# Patient Record
Sex: FEMALE
[De-identification: names, ages, dates, MRNs, and addresses within clinical notes are randomized per-mention and may not be internally consistent; named-entity substitution may affect disease eponyms.]

---

## 2021-06-12 ENCOUNTER — HOSPITAL ENCOUNTER (INPATIENT)
Dept: HOSPITAL 5 - ED | Age: 56
LOS: 4 days | Discharge: HOME HEALTH | DRG: 189 | End: 2021-06-16
Attending: INTERNAL MEDICINE | Admitting: INTERNAL MEDICINE
Payer: MEDICAID

## 2021-06-12 DIAGNOSIS — T38.0X5A: ICD-10-CM

## 2021-06-12 DIAGNOSIS — F17.210: ICD-10-CM

## 2021-06-12 DIAGNOSIS — J20.9: ICD-10-CM

## 2021-06-12 DIAGNOSIS — Z90.49: ICD-10-CM

## 2021-06-12 DIAGNOSIS — J43.2: ICD-10-CM

## 2021-06-12 DIAGNOSIS — F32.9: ICD-10-CM

## 2021-06-12 DIAGNOSIS — Z71.51: ICD-10-CM

## 2021-06-12 DIAGNOSIS — Z79.899: ICD-10-CM

## 2021-06-12 DIAGNOSIS — D75.1: ICD-10-CM

## 2021-06-12 DIAGNOSIS — Z20.822: ICD-10-CM

## 2021-06-12 DIAGNOSIS — E87.5: ICD-10-CM

## 2021-06-12 DIAGNOSIS — J96.01: Primary | ICD-10-CM

## 2021-06-12 DIAGNOSIS — Z90.710: ICD-10-CM

## 2021-06-12 DIAGNOSIS — J45.909: ICD-10-CM

## 2021-06-12 DIAGNOSIS — Z79.01: ICD-10-CM

## 2021-06-12 DIAGNOSIS — C34.91: ICD-10-CM

## 2021-06-12 DIAGNOSIS — Z88.8: ICD-10-CM

## 2021-06-12 DIAGNOSIS — D72.829: ICD-10-CM

## 2021-06-12 DIAGNOSIS — Z79.891: ICD-10-CM

## 2021-06-12 DIAGNOSIS — Z98.1: ICD-10-CM
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
## 2021-06-12 DIAGNOSIS — R91.8: ICD-10-CM

## 2021-06-12 DIAGNOSIS — G62.9: ICD-10-CM

## 2021-06-12 DIAGNOSIS — Z91.19: ICD-10-CM

## 2021-06-12 LAB
ALBUMIN SERPL-MCNC: 3.9 G/DL (ref 3.9–5)
ALT SERPL-CCNC: 10 UNITS/L (ref 7–56)
APTT BLD: 28.1 SEC. (ref 24.2–36.6)
BASOPHILS # (AUTO): 0.1 K/MM3 (ref 0–0.1)
BASOPHILS NFR BLD AUTO: 0.8 % (ref 0–1.8)
BUN SERPL-MCNC: 14 MG/DL (ref 7–17)
BUN/CREAT SERPL: 16 %
CALCIUM SERPL-MCNC: 9.1 MG/DL (ref 8.4–10.2)
EOSINOPHIL # BLD AUTO: 0.2 K/MM3 (ref 0–0.4)
EOSINOPHIL NFR BLD AUTO: 1.8 % (ref 0–4.3)
HCT VFR BLD CALC: 46.9 % (ref 30.3–42.9)
HEMOLYSIS INDEX: 29
HGB BLD-MCNC: 16 GM/DL (ref 10.1–14.3)
INR PPP: 0.93 (ref 0.87–1.13)
LYMPHOCYTES # BLD AUTO: 1.8 K/MM3 (ref 1.2–5.4)
LYMPHOCYTES NFR BLD AUTO: 18.6 % (ref 13.4–35)
MCHC RBC AUTO-ENTMCNC: 34 % (ref 30–34)
MCV RBC AUTO: 94 FL (ref 79–97)
MONOCYTES # (AUTO): 1 K/MM3 (ref 0–0.8)
MONOCYTES % (AUTO): 10.8 % (ref 0–7.3)
PLATELET # BLD: 175 K/MM3 (ref 140–440)
RBC # BLD AUTO: 5.02 M/MM3 (ref 3.65–5.03)

## 2021-06-12 PROCEDURE — 94644 CONT INHLJ TX 1ST HOUR: CPT

## 2021-06-12 PROCEDURE — 85379 FIBRIN DEGRADATION QUANT: CPT

## 2021-06-12 PROCEDURE — 85007 BL SMEAR W/DIFF WBC COUNT: CPT

## 2021-06-12 PROCEDURE — 80053 COMPREHEN METABOLIC PANEL: CPT

## 2021-06-12 PROCEDURE — 86140 C-REACTIVE PROTEIN: CPT

## 2021-06-12 PROCEDURE — 84145 PROCALCITONIN (PCT): CPT

## 2021-06-12 PROCEDURE — 94640 AIRWAY INHALATION TREATMENT: CPT

## 2021-06-12 PROCEDURE — 84484 ASSAY OF TROPONIN QUANT: CPT

## 2021-06-12 PROCEDURE — 71275 CT ANGIOGRAPHY CHEST: CPT

## 2021-06-12 PROCEDURE — 96375 TX/PRO/DX INJ NEW DRUG ADDON: CPT

## 2021-06-12 PROCEDURE — 36415 COLL VENOUS BLD VENIPUNCTURE: CPT

## 2021-06-12 PROCEDURE — 93970 EXTREMITY STUDY: CPT

## 2021-06-12 PROCEDURE — 83036 HEMOGLOBIN GLYCOSYLATED A1C: CPT

## 2021-06-12 PROCEDURE — 83615 LACTATE (LD) (LDH) ENZYME: CPT

## 2021-06-12 PROCEDURE — G0378 HOSPITAL OBSERVATION PER HR: HCPCS

## 2021-06-12 PROCEDURE — 82728 ASSAY OF FERRITIN: CPT

## 2021-06-12 PROCEDURE — 93005 ELECTROCARDIOGRAM TRACING: CPT

## 2021-06-12 PROCEDURE — 83735 ASSAY OF MAGNESIUM: CPT

## 2021-06-12 PROCEDURE — 71045 X-RAY EXAM CHEST 1 VIEW: CPT

## 2021-06-12 PROCEDURE — 85730 THROMBOPLASTIN TIME PARTIAL: CPT

## 2021-06-12 PROCEDURE — 85610 PROTHROMBIN TIME: CPT

## 2021-06-12 PROCEDURE — 96365 THER/PROPH/DIAG IV INF INIT: CPT

## 2021-06-12 PROCEDURE — U0003 INFECTIOUS AGENT DETECTION BY NUCLEIC ACID (DNA OR RNA); SEVERE ACUTE RESPIRATORY SYNDROME CORONAVIRUS 2 (SARS-COV-2) (CORONAVIRUS DISEASE [COVID-19]), AMPLIFIED PROBE TECHNIQUE, MAKING USE OF HIGH THROUGHPUT TECHNOLOGIES AS DESCRIBED BY CMS-2020-01-R: HCPCS

## 2021-06-12 PROCEDURE — 82962 GLUCOSE BLOOD TEST: CPT

## 2021-06-12 PROCEDURE — 85025 COMPLETE CBC W/AUTO DIFF WBC: CPT

## 2021-06-12 PROCEDURE — 82947 ASSAY GLUCOSE BLOOD QUANT: CPT

## 2021-06-12 PROCEDURE — 80048 BASIC METABOLIC PNL TOTAL CA: CPT

## 2021-06-12 NOTE — EVENT NOTE
ED Screening Note


Date of service: 06/12/21


Time: 17:04


ED Screening Note: 


56-year-old female with a past medical history of COPD presents to the ER today 

with complaints of increased shortness of breath for the past 3 to 4 days.  She 

reports associated cough, wheezing, low-grade fever of 99.5, rhinorrhea, nasal 

congestion, sore throat, wheezing and chest tightness.  She also complains of a 

rash to her lower abdomen and buttocks area which she noticed a few days ago and

has been pruritic in nature.





She admits that she used to be on oxygen but has been on oxygen in the past 5 to

6 years due to her noncompliance with follow-up and a history of drug abuse she 

has been in [facility].  She has also been out of her inhalers and nebulizer 

treatments for of about 2 years


She is currently in rehab for her drug abuse.  Drug of choice was crack.  She is

recommending that we do not give her any narcotics or addictive medications 

during her visit.








This initial assessment/diagnostic orders/clinical plan/treatment(s) is/are 

subject to change based on patients health status, clinical progression and re-

assessment by fellow clinical providers in the ED. Further treatment and workup 

at subsequent clinical providers discretion. Patient/guardian urged not to elope

from the ED as their condition may be serious if not clinically assessed and 

managed. 





Initial orders include: 


Dyspnea order set including nebulizer treatment and Solu-Medrol

## 2021-06-12 NOTE — EMERGENCY DEPARTMENT REPORT
ED Shortness of Breath HPI





- General


Chief Complaint: Dyspnea/Respdistress


Stated Complaint: TROUBLE BREATHING/COUGH/FEVER/RASH


Time Seen by Provider: 06/12/21 21:52


Source: patient


Mode of arrival: Ambulatory


Limitations: No Limitations





- History of Present Illness


Initial Comments: 





Patient is a 56-year-old female that presents emergency room with complaints of 

difficulty breathing, shortness of breath, cough, fever.  Patient states that 

she has been going on for 1 week.  Patient states her symptoms are worsening.  

Patient states she has history of asthma and COPD and she not take anything for 

it.  Patient states that she also has a rash on her abdomen has been going on 

for 1 week.  Patient states the rash itches.  Patient denies pain in her ab

domen.  Patient denies chest pain.


MD Complaint: shortness of breath, cough


-: Sudden, days(s)


Severity: severe


Improves With: rest


Worsens With: exertion


Known History Of: COPD, asthma


Context: medication noncompliance


Associated Symptoms: fever, cough


Treatments Prior to Arrival: none





- Related Data


Home Oxygen Therapy: No


                                Home Medications











 Medication  Instructions  Recorded  Confirmed  Last Taken


 


Baclofen 20 mg PO Q8H PRN 04/17/14 04/17/14 Unknown


 


Citalopram [Celexa] 20 mg PO DAILY 04/17/14 04/17/14 Unknown


 


Pregabalin [Lyrica] 150 mg PO BID 04/17/14 04/17/14 Unknown


 


traMADoL [Ultram 50 MG tab] 50 mg PO Q8H PRN 04/17/14 04/17/14 Unknown








                                  Previous Rx's











 Medication  Instructions  Recorded  Last Taken  Type


 


Naproxen [Naprosyn] 500 mg PO BID PRN 5 Days #10 tablet 07/28/20 Unknown Rx


 


methOCARBAMOL [Robaxin TAB] 1,500 mg PO Q8H PRN #22 tablet 07/28/20 Unknown Rx











                                    Allergies











Allergy/AdvReac Type Severity Reaction Status Date / Time


 


steroids AdvReac  Unknown Uncoded 04/17/14 14:28














ED Review of Systems


ROS: 


Stated complaint: TROUBLE BREATHING/COUGH/FEVER/RASH


Other details as noted in HPI





Constitutional: fever.  denies: chills


Eyes: denies: eye pain, eye discharge, vision change


ENT: denies: ear pain, throat pain


Respiratory: cough, shortness of breath, SOB with exertion, SOB at rest.  

denies: wheezing


Cardiovascular: denies: chest pain, palpitations


Endocrine: no symptoms reported


Gastrointestinal: denies: abdominal pain, nausea, diarrhea


Genitourinary: denies: urgency, dysuria, discharge


Musculoskeletal: denies: back pain, joint swelling, arthralgia


Skin: as per HPI, rash.  denies: lesions


Neurological: denies: headache, weakness, paresthesias


Psychiatric: denies: anxiety, depression


Hematological/Lymphatic: denies: easy bleeding, easy bruising





ED Past Medical Hx





- Past Medical History


Previous Medical History?: Yes


Hx Psychiatric Treatment: Yes (Depresssion)


Hx Asthma: Yes


Hx COPD: Yes


Additional medical history: Chrons.  ulcerative colitis.  Hep B.  neuropathy





- Surgical History


Past Surgical History?: Yes


Hx Appendectomy: Yes


Additional Surgical History: hysterectomy.  tonsillectomy.  Cervical fusion





- Social History


Smoking Status: Current Every Day Smoker


Substance Use Type: None





- Medications


Home Medications: 


                                Home Medications











 Medication  Instructions  Recorded  Confirmed  Last Taken  Type


 


Baclofen 20 mg PO Q8H PRN 04/17/14 04/17/14 Unknown History


 


Citalopram [Celexa] 20 mg PO DAILY 04/17/14 04/17/14 Unknown History


 


Pregabalin [Lyrica] 150 mg PO BID 04/17/14 04/17/14 Unknown History


 


traMADoL [Ultram 50 MG tab] 50 mg PO Q8H PRN 04/17/14 04/17/14 Unknown History


 


Naproxen [Naprosyn] 500 mg PO BID PRN 5 Days #10 tablet 07/28/20  Unknown Rx


 


methOCARBAMOL [Robaxin TAB] 1,500 mg PO Q8H PRN #22 tablet 07/28/20  Unknown Rx














ED Physical Exam





- General


Limitations: No Limitations


General appearance: alert, in distress





- Head


Head exam: Present: atraumatic, normocephalic





- Eye


Eye exam: Present: normal appearance, PERRL


Pupils: Present: normal accommodation





- ENT


ENT exam: Present: mucous membranes dry





- Neck


Neck exam: Present: normal inspection





- Respiratory


Respiratory exam: Present: respiratory distress, wheezes, accessory muscle use





- Cardiovascular


Cardiovascular Exam: Present: regular rate, normal rhythm.  Absent: systolic 

murmur, diastolic murmur, rubs, gallop





- GI/Abdominal


GI/Abdominal exam: Present: soft, normal bowel sounds





- Extremities Exam


Extremities exam: Present: normal inspection





- Back Exam


Back exam: Present: normal inspection





- Neurological Exam


Neurological exam: Present: alert, oriented X3





- Psychiatric


Psychiatric exam: Present: normal affect, normal mood





- Skin


Skin exam: Present: warm, dry, intact, normal color.  Absent: rash





ED Course


                                   Vital Signs











  06/12/21 06/12/21 06/12/21





  14:56 22:13 22:37


 


Temperature 99.4 F  


 


Pulse Rate 86  


 


Pulse Rate [  80 





Bilateral   





Throughout]   


 


Respiratory 30 H  18





Rate   


 


Respiratory  20 





Rate [Bilateral   





Throughout]   


 


Blood Pressure   


 


Blood Pressure 111/70  





[Right]   


 


O2 Sat by Pulse 95  





Oximetry   














  06/12/21 06/12/21 06/12/21





  22:50 23:00 23:16


 


Temperature   


 


Pulse Rate   


 


Pulse Rate [   





Bilateral   





Throughout]   


 


Respiratory   





Rate   


 


Respiratory   





Rate [Bilateral   





Throughout]   


 


Blood Pressure 101/61 109/63 109/63


 


Blood Pressure   





[Right]   


 


O2 Sat by Pulse  88 83 L





Oximetry   














  06/12/21





  23:30


 


Temperature 


 


Pulse Rate 


 


Pulse Rate [ 





Bilateral 





Throughout] 


 


Respiratory 





Rate 


 


Respiratory 





Rate [Bilateral 





Throughout] 


 


Blood Pressure 109/63


 


Blood Pressure 





[Right] 


 


O2 Sat by Pulse 85





Oximetry 














- Reevaluation(s)


Reevaluation #1: 


Patient states she is feeling a little bit better.  Patient's work to breathe ha

s decreased slightly.  Patient's oxygen is 83 percent on room air.  Patient 

placed on oxygen.  After oxygen placed patient's oxygen improved to 96%.  

Patient is on 5 L.


06/12/21 22:11











Reevaluation #2: 


I discussed all results with patient.  I discussed plan of care with patient.  

Patient agrees with plan of care and admission.  Patient to be admitted to the 

hospitalist service.


06/13/21 00:08








ED Medical Decision Making





- Lab Data


Result diagrams: 


                                 06/12/21 17:04





                                 06/12/21 17:04





- EKG Data


-: EKG Interpreted by Me


EKG shows normal: sinus rhythm, axis, intervals, QRS complexes, ST-T waves


Rate: normal





- Radiology Data


Radiology results: report reviewed, image reviewed


interpreted by me: 





Chest x-ray: No pneumonia, no pneumothorax, no foreign body, no osseous 

findings, no acute findings














CTA CHEST WITH IV CONTRAST  


 


 INDICATION: Shortness of breath  


 


 TECHNIQUE:  


 Axial CT images were obtained through the chest after injection of IV contrast.

  Coronal oblique 2-


D reconstruction images were produced.  3 plane MIP reconstruction images were 

produced at an 


independent workstation.  All CTs at this facility utilize dose reduction 

techniques including 


automated exposure control, iterative reconstruction and weight based dosing 

when appropriate to 


reduce patient radiation dose to as low as reasonable achievable.    


 


 COMPARISON: Chest radiograph, 6/12/2021. No prior cross-sectional imaging is 

available for 


comparison  


 


 FINDINGS:  


 No filling defects are visualized within the central or segmental pulmonary 

arteries to suggest 


pulmonary embolism. The heart is normal in size. The thoracic aorta is normal in

 caliber.  


 


 Evaluation of the lung parenchyma demonstrates a spiculated mass in the right 

upper lobe measuring 


1.6 x 1.4 cm. A similar smaller spiculated density is seen within the left upper

 lobe measuring 0.8 


x 0.9 cm. There is a background of diffuse emphysematous change.  


 


 Limited imaging of the upper abdomen demonstrates No evidence of acute 

abnormality.  


 


 Bones and soft tissues: Evaluation of bony structures demonstrates a 9 mm 

sclerotic lesion within 


the left scapula. There are moderate multilevel degenerative changes throughout 

the thoracic spine.  


 


 IMPRESSION:  


 1.  Spiculated mass within the right upper lobe and smaller spiculated density 

within the left 


upper lobe. Findings are concerning for bronchogenic neoplasm.  


 2. Sclerotic lesion within the left scapula is nonspecific but could represent 

metastasis given the


patient's suspicious lung mass.  


 3. No evidence of pulmonary embolism.  


 4. Emphysema.  


=========================================================================


 


CHEST 1 VIEW 6/12/2021 5:22 PM  


 


 INDICATION / CLINICAL INFORMATION: Dyspnea.  


 


 COMPARISON: 3/21/2020  


 


 FINDINGS:  


 


 SUPPORT DEVICES: None.  


 HEART / MEDIASTINUM: Stable.   


 LUNGS / PLEURA: No significant pulmonary or pleural abnormality. No 

pneumothorax.   


 


 ADDITIONAL FINDINGS: No significant additional findings.  


 


 IMPRESSION:  


 1. No acute findings.


=========================================================================





- Medical Decision Making





Patient is a 56-year-old female who presents emergency room with complaints of 

shortness of breath, cough and fever.  Patient's found to have increased work of

 breathing respiratory distress plan initial evaluation.  Patient given 

medications to include Solu-Medrol, Decadron and mag.  Patient continued to have

 low oxygen even after treatment.  Patient was then placed on 5 L of oxygen and 

her O2 sat improved.  Patient had a chest x-ray which was negative for acute 

finding.  I personally reviewed the chest x-ray patient had a EKG which shows 

normal ST and no acute findings.  I personally reviewed EKG.  Patient had a CTA 

to rule out PE and it shows lung masses and no other acute findings.  Patient 

had labs done which were essentially unremarkable.  Patient admitted to the [facility] service for further evaluation treatment.





Critical care time documented due to the multiple reassessments, prolonged time 

at the bedside, interpretation of diagnostics and labs.











- Differential Diagnosis


COPD exacerbation, hypoxia, shortness of breath, Covid, pneumonia, PE


Critical Care Time: Yes


Critical care time in (mins) excluding proc time.: 35


Critical care attestation.: 


If time is entered above; I have spent that time in minutes in the direct care 

of this critically ill patient, excluding procedure time.





Critical Care Time: 





35 minutes











ED Disposition


Clinical Impression: 


 SOB (shortness of breath), COPD exacerbation, Lung mass





Respiratory failure


Qualifiers:


 Chronicity: acute Respiratory failure complication: hypoxia Qualified Code(s): 

J96.01 - Acute respiratory failure with hypoxia





Fever


Qualifiers:


 Fever type: unspecified Qualified Code(s): R50.9 - Fever, unspecified





Disposition: DC-09 OP ADMIT IP TO THIS HOSP


Is pt being admited?: Yes


Does the pt Need Aspirin: No


Condition: Critical


Instructions:  Chronic Obstructive Pulmonary Disease (ED)


Time of Disposition: 00:09

## 2021-06-12 NOTE — XRAY REPORT
CHEST 1 VIEW 6/12/2021 5:22 PM



INDICATION / CLINICAL INFORMATION: Dyspnea.



COMPARISON: 3/21/2020



FINDINGS:



SUPPORT DEVICES: None.

HEART / MEDIASTINUM: Stable. 

LUNGS / PLEURA: No significant pulmonary or pleural abnormality. No pneumothorax. 



ADDITIONAL FINDINGS: No significant additional findings.



IMPRESSION:

1. No acute findings.



Signer Name: Oziel Hernandez MD 

Signed: 6/12/2021 5:32 PM

Workstation Name: [id number]

## 2021-06-12 NOTE — CAT SCAN REPORT
CTA CHEST WITH IV CONTRAST



INDICATION: Shortness of breath



TECHNIQUE:

Axial CT images were obtained through the chest after injection of IV contrast.  Coronal oblique 2-D 
reconstruction images were produced.  3 plane MIP reconstruction images were produced at an [employer] workstation.  All CTs at this facility utilize dose reduction techniques including automated expos
ure control, iterative reconstruction and weight based dosing when appropriate to reduce patient radi
ation dose to as low as reasonable achievable.  



COMPARISON: Chest radiograph, 6/12/2021. No prior cross-sectional imaging is available for comparison




FINDINGS:

No filling defects are visualized within the central or segmental pulmonary arteries to suggest pulmo
nary embolism. The heart is normal in size. The thoracic aorta is normal in caliber.



Evaluation of the lung parenchyma demonstrates a spiculated mass in the right upper lobe measuring 1.
6 x 1.4 cm. A similar smaller spiculated density is seen within the left upper lobe measuring 0.8 x 0
.9 cm. There is a background of diffuse emphysematous change.



Limited imaging of the upper abdomen demonstrates No evidence of acute abnormality.



Bones and soft tissues: Evaluation of bony structures demonstrates a 9 mm sclerotic lesion within the
 left scapula. There are moderate multilevel degenerative changes throughout the thoracic spine.



IMPRESSION:

1.  Spiculated mass within the right upper lobe and smaller spiculated density within the left upper 
lobe. Findings are concerning for bronchogenic neoplasm.

2. Sclerotic lesion within the left scapula is nonspecific but could represent metastasis given the p
atient's suspicious lung mass.

3. No evidence of pulmonary embolism.

4. Emphysema.



Signer Name: Destiny Lozano MD 

Signed: 6/12/2021 11:19 PM

Workstation Name: [id number]

## 2021-06-13 LAB — CRP SERPL-MCNC: 2.4 MG/DL (ref 0–1.3)

## 2021-06-13 RX ADMIN — METHYLPREDNISOLONE SODIUM SUCCINATE SCH MG: 125 INJECTION, POWDER, FOR SOLUTION INTRAMUSCULAR; INTRAVENOUS at 21:49

## 2021-06-13 RX ADMIN — ENOXAPARIN SODIUM SCH MG: 100 INJECTION SUBCUTANEOUS at 10:20

## 2021-06-13 RX ADMIN — Medication SCH ML: at 10:20

## 2021-06-13 RX ADMIN — IPRATROPIUM BROMIDE AND ALBUTEROL SULFATE SCH AMPUL: .5; 3 SOLUTION RESPIRATORY (INHALATION) at 20:36

## 2021-06-13 RX ADMIN — Medication SCH ML: at 21:49

## 2021-06-13 RX ADMIN — FAMOTIDINE SCH MG: 10 INJECTION, SOLUTION INTRAVENOUS at 10:20

## 2021-06-13 RX ADMIN — FAMOTIDINE SCH MG: 10 INJECTION, SOLUTION INTRAVENOUS at 21:49

## 2021-06-13 NOTE — HISTORY AND PHYSICAL REPORT
History of Present Illness


Date of examination: 06/13/21


Date of admission: 


06/13/21 00:11





Chief complaint: 





shortness of breath


History of present illness: 





Patient is a 56-year-old female that presents emergency room with complaints of 

difficulty breathing, shortness of breath, cough, fever.  Patient states that 

she has been going on for 1 week.  Patient states her symptoms are worsening.  

Patient states she has history of asthma and COPD and she not take anything for 

it.  Patient states that she also has a rash on her abdomen has been going on 

for 1 week.  Patient states the rash itches.  Patient denies pain in her 

abdomen.  Patient denies chest pain.





ED work-up shows WBC 9.5 hemoglobin 16.0 platelets is 175, sodium is 138, 

potassium is 5.5, D-dimer is 45.04, creatinine is 0.9.  Chest x-ray done no 

acute finding.  Due to elevated D-dimer CT of the chest donenegative for 

pulmonary embolism per report showed emphysema and lung mass within the right up

per lobe and small spiculated density within the left upper lobe finding 

concerning for bronchogenic neoplasm.  Patient seen at bedside in the ED.  She 

is alert oriented x3.  She is on oxygen per nasal cannula.  She reported mild 

shortness of breath.  She admits tobacco use 1 pack daily.  Discussed tobacco 

use cessation.  Patient voiced understanding.  Reviewed lab medication record 

and vital signs.  Will consult pulmonologist.





Past History


Past Medical History: COPD


Past Surgical History: appendectomy, hysterectomy, tonsillectomy


Social history: smoking


Family history: no significant family history





Medications and Allergies


                                    Allergies











Allergy/AdvReac Type Severity Reaction Status Date / Time


 


steroids AdvReac  Unknown Uncoded 04/17/14 14:28











                                Home Medications











 Medication  Instructions  Recorded  Confirmed  Last Taken  Type


 


Baclofen 20 mg PO Q8H PRN 04/17/14 06/13/21 Unknown History


 


Citalopram [Celexa] 20 mg PO DAILY 04/17/14 06/13/21 Unknown History


 


Pregabalin [Lyrica] 150 mg PO BID 04/17/14 06/13/21 Unknown History


 


traMADoL [Ultram 50 MG tab] 50 mg PO Q8H PRN 04/17/14 06/13/21 Unknown History


 


Naproxen [Naprosyn] 500 mg PO BID PRN 5 Days #10 tablet 07/28/20 06/13/21 

Unknown Rx


 


methOCARBAMOL [Robaxin TAB] 1,500 mg PO Q8H PRN #22 tablet 07/28/20 06/13/21 

Unknown Rx














Review of Systems


Ears, nose, mouth and throat: no epistaxis, no bleeding gums


Respiratory: cough, shortness of breath


Gastrointestinal: no melena


Rectal: no itching, no hemorrhoids


Musculoskeletal: no hot joints


Integumentary: no rash, no pruritis


Psychiatric: anxiety, depression


Hematologic/Lymphatic: no easy bruising, no easy bleeding


Allergic/Immunologic: no urticaria





Exam





- Constitutional


Vitals: 


                                        











Temp Pulse Resp BP Pulse Ox


 


 99.4 F   80   18   109/63   85 


 


 06/12/21 14:56  06/12/21 22:13  06/12/21 22:37  06/12/21 23:30  06/12/21 23:30











General appearance: Present: mild distress, well-nourished





- EENT


Eyes: Present: PERRL


ENT: hearing intact, clear oral mucosa





- Neck


Neck: Present: supple, normal ROM





- Respiratory


Respiratory effort: normal


Respiratory: bilateral: CTA





- Cardiovascular


Heart Sounds: Present: S1 & S2.  Absent: rub, click





- Extremities


Extremities: pulses symmetrical, No edema


Peripheral Pulses: within normal limits





- Abdominal


General gastrointestinal: Present: soft, non-tender, non-distended, normal bowel

sounds


Female genitourinary: Present: normal





- Integumentary


Integumentary: Present: clear, warm, dry





- Musculoskeletal


Musculoskeletal: gait normal, strength equal bilaterally





- Psychiatric


Psychiatric: appropriate mood/affect, intact judgment & insight, cooperative





- Neurologic


Neurologic: CNII-XII intact, moves all extremities





HEART Score





- HEART Score


Troponin: 


                                        











Troponin T  < 0.010 ng/mL (0.00-0.029)   06/12/21  22:53    














Results





- Labs


CBC & Chem 7: 


                                 06/12/21 17:04





                                 06/13/21 01:55


Labs: 


                              Abnormal lab results











  06/12/21 06/12/21 Range/Units





  17:04 17:04 


 


Hgb  16.0 H   (10.1-14.3)  gm/dl


 


Hct  46.9 H   (30.3-42.9)  %


 


Mono % (Auto)  10.8 H   (0.0-7.3)  %


 


Mono # (Auto)  1.0 H   (0.0-0.8)  K/mm3


 


Potassium   5.5 H  (3.6-5.0)  mmol/L














Assessment and Plan





- Patient Problems


(1) COPD exacerbation


Current Visit: Yes   Status: Acute   


Plan to address problem: 


Continue respiratory care


Bronchodilator and oxygen supplement


Pulmonologist consulted








(2) Lung mass


Current Visit: Yes   Status: Acute   


Plan to address problem: 


Lung mass appreciated on CT of the chest


Pulmonologist consults


Monitor ABG and oxygen supplement








(3) Respiratory failure with hypoxia


Current Visit: Yes   Status: Acute   


Plan to address problem: 


Hypoxia likely secondary to emphysema lung mass


Continue oxygen supplements








(4) Tobacco abuse


Current Visit: Yes   Status: Acute   


Plan to address problem: 


Patient admits tobacco use 1 pack daily 


discussed tobacco use cessation








(5) Elevated d-dimer


Current Visit: Yes   Status: Acute   


Plan to address problem: 


CT of the chest is negative for PE


Bilateral leg ultrasound ordered








(6) DVT prophylaxis


Current Visit: Yes   Status: Acute   


Plan to address problem: 


Subcutaneous Lovenox

## 2021-06-13 NOTE — CONSULTATION
History of Present Illness





- Reason for Consult


Consult date: 06/13/21


COVID-19 PUI


Requesting physician: CEZAR GIBSON III





- History of Present Illness





The patient is a 55-year-old female with COPD admitted with shortness of breath,

cough for 1 week.  She was noted to be hypoxic on admission.  Labs revealed no 

leukocytosis, D-dimer 1245, ferritin 50.4, CRP 2.4, procalcitonin 0.08.  COVID-

19 test is pending.  She is admitted as PUI.  DVT scan negative.  CT chest with 

spiculated mass in right upper lobe and smaller spiculated density in left upper

lobe suggestive of possible neoplasm.  No evidence of obvious pneumonia.





Review of Systems: 


reviewed in the chart, unable to obtain, minimize risk of transmission





Past History


Past Medical History: COPD


Past Surgical History: appendectomy, hysterectomy, tonsillectomy


Social history: smoking


Family history: no significant family history





Medications and Allergies


                                    Allergies











Allergy/AdvReac Type Severity Reaction Status Date / Time


 


steroids AdvReac  Unknown Uncoded 04/17/14 14:28











                                Home Medications











 Medication  Instructions  Recorded  Confirmed  Last Taken  Type


 


Baclofen 20 mg PO Q8H PRN 04/17/14 06/13/21 Unknown History


 


Citalopram [Celexa] 20 mg PO DAILY 04/17/14 06/13/21 Unknown History


 


Pregabalin [Lyrica] 150 mg PO BID 04/17/14 06/13/21 Unknown History


 


traMADoL [Ultram 50 MG tab] 50 mg PO Q8H PRN 04/17/14 06/13/21 Unknown History


 


Naproxen [Naprosyn] 500 mg PO BID PRN 5 Days #10 tablet 07/28/20 06/13/21 

Unknown Rx


 


methOCARBAMOL [Robaxin TAB] 1,500 mg PO Q8H PRN #22 tablet 07/28/20 06/13/21 

Unknown Rx











Active Meds: 


Active Medications





Acetaminophen (Acetaminophen 325 Mg Tab)  650 mg PO Q4H PRN


   PRN Reason: Pain MILD(1-3)/Fever >100.5/HA


Al Hydrox/Mg Hydrox/Simethicone (Alum-Mag Hydroxide-Simethicone [phone]/5ml

Oral Liqd 30 Ml)  30 ml PO Q4H PRN


   PRN Reason: Indigestion


Enoxaparin Sodium (Enoxaparin 40 Mg/0.4 Ml Inj)  40 mg SUB-Q DAILY [facility]; Protocol


   Last Admin: 06/13/21 10:20 Dose:  40 mg


   Documented by: 


Famotidine (Famotidine 20 Mg/2 Ml Inj)  20 mg IV BID [facility]


   Last Admin: 06/13/21 10:20 Dose:  20 mg


   Documented by: 


Magnesium Hydroxide (Magnesium Hydroxide (Mom) Oral Liqd Udc)  30 ml PO Q4H PRN


   PRN Reason: Constipation


Ondansetron HCl (Ondansetron 4 Mg/2 Ml Inj)  4 mg IV Q8H PRN


   PRN Reason: Nausea And Vomiting


Senna (Sennosides 8.6 Mg Tab)  8.6 mg PO Q12HR PRN


   PRN Reason: Constipation


Sodium Chloride (Sodium Chloride 0.9% 10 Ml Flush Syringe)  10 ml IV BID [facility]


   Last Admin: 06/13/21 10:20 Dose:  10 ml


   Documented by: 


Sodium Chloride (Sodium Chloride 0.9% 10 Ml Flush Syringe)  10 ml IV PRN PRN


   PRN Reason: LINE FLUSH











Physical Examination





- Physical Exam


Narrative exam: 





Physical Exam (reviewed in chart to minimize risk of transmission)


Constitutional: deferred


Head, Ears, Nose: deferred


Eyes: deferred


Neck: deferred


Oral: deferred


Cardiovascular: deferred


Respiratory: deferred


GI: deferred


Musculoskeletal: deferred


Skin: deferred


Hem/Lymphatic: deferred


Psych: deferred


Neurological: deferred





- Constitutional


Vitals: 


                                   Vital Signs











Temp Pulse Resp BP Pulse Ox


 


 98.5 F   68   22   106/68   96 


 


 06/13/21 12:37  06/13/21 12:37  06/13/21 12:37  06/13/21 12:37  06/13/21 12:37








                           Temperature -Last 24 Hours











Temperature                    98.5 F


 


Temperature                    98.2 F


 


Temperature                    99.4 F

















Results





- Labs


CBC & Chem 7: 


                                 06/12/21 17:04





                                 06/13/21 01:55


Labs: 


                              Abnormal lab results











  06/12/21 06/12/21 06/13/21 Range/Units





  17:04 17:04 01:55 


 


Hgb  16.0 H    (10.1-14.3)  gm/dl


 


Hct  46.9 H    (30.3-42.9)  %


 


Mono % (Auto)  10.8 H    (0.0-7.3)  %


 


Mono # (Auto)  1.0 H    (0.0-0.8)  K/mm3


 


D-Dimer    1245.04 H  (0-234)  ng/mlDDU


 


Potassium   5.5 H   (3.6-5.0)  mmol/L


 


Glucose     ()  mg/dL


 


C-Reactive Protein     (0.00-1.30)  mg/dL














  06/13/21 Range/Units





  01:55 


 


Hgb   (10.1-14.3)  gm/dl


 


Hct   (30.3-42.9)  %


 


Mono % (Auto)   (0.0-7.3)  %


 


Mono # (Auto)   (0.0-0.8)  K/mm3


 


D-Dimer   (0-234)  ng/mlDDU


 


Potassium   (3.6-5.0)  mmol/L


 


Glucose  177 H  ()  mg/dL


 


C-Reactive Protein  2.40 H  (0.00-1.30)  mg/dL














- Imaging and Cardiology


Chest x-ray: report reviewed, image reviewed (no pneumonia seen.)





Assessment and Plan


Cultures:


SARS CoV2 PCR: Pending








A/P:


55-year-old female with COPD admitted with shortness of breath, cough for 1 

week:





#COVID-19 PUI: Pending.  Inflammatory markers are not elevated. CXR without 

pneumonia. 





#Acute hypoxic respiratory failure





#COPD





#Spiculated lung lesion: Evaluation per primary team








Recs:


Follow-up COVID-19 PCR.  Suspicion is low.


Procalcitonin is low, antibiotics not needed


Work-up for lung lesion per primary/pulmonary








Guillermina Foss MD, FACP


[facility] ([facility]


O: [phone]


F: [phone]

## 2021-06-13 NOTE — EVENT NOTE
Date: 06/13/21





This is the second visit after midnight


Patient seen and examined


Vitals noted and stable


Patient does complains of shortness of breath but denies any chest pain


Coarse breath sounds auscultated bilaterally, S1-S2 positive, bowel sounds 

positive, no leg edema


56-year-old female admitted to the hospital for shortness of breath due to COPD 

exacerbation and possible right lung carcinoma


Continue empiric antibiotic, empiric steroid, scheduled nebulizer breathing 

treatment


Consulted pulmonary, patient may need lung biopsy


Continue current management and plan and follow pulmonary recommendation





--It took me about 28 minutes to reevaluate and reasses this patient, discussed 

with RN/CM, review medical documents, lab results, imaging, medication list and 

placing order.

## 2021-06-13 NOTE — VASCULAR LAB REPORT
DUPLEX DOPPLER LOWER EXTREMITY VEINS, BILATERAL



INDICATION / CLINICAL INFORMATION:

r/o dvt.



TECHNIQUE:

Duplex doppler imaging was performed through the veins of both lower extremities using venous indio
simone and other maneuvers.



COMPARISON: 

None available.



FINDINGS:

RIGHT COMMON FEMORAL VEIN: Negative.

RIGHT FEMORAL VEIN: Negative.

RIGHT POPLITEAL VEIN: Negative.

RIGHT CALF VEINS: Negative.



LEFT COMMON FEMORAL VEIN: Negative.

LEFT FEMORAL VEIN: Negative.

LEFT POPLITEAL VEIN: Negative.

LEFT CALF VEINS: Negative.



ADDITIONAL FINDINGS: None.



IMPRESSION:

1. No sonographic evidence for DVT in either lower extremity.



Signer Name: Oziel Hernandez MD 

Signed: 6/13/2021 11:03 AM

Workstation Name: [id number]

## 2021-06-13 NOTE — CONSULTATION
History of Present Illness


Consult date: 06/13/21


Requesting physician: TERRA AVILES


Reason for consult: dyspnea


History of present illness: 





57 yo admitted w/ increased SOB, wheezing, cough with white sputum. No fevers, 

chills, chest pain, hemoptysis.





Active Medications





Acetaminophen (Acetaminophen 325 Mg Tab)  650 mg PO Q4H PRN


   PRN Reason: Pain MILD(1-3)/Fever >100.5/HA


Al Hydrox/Mg Hydrox/Simethicone (Alum-Mag Hydroxide-Simethicone [phone]/5ml

Oral Liqd 30 Ml)  30 ml PO Q4H PRN


   PRN Reason: Indigestion


Albuterol/Ipratropium (Ipratropium/Albuterol Sulfate 3 Ml Ampul.Neb)  1 ampul [facility]

[facility]


Enoxaparin Sodium (Enoxaparin 40 Mg/0.4 Ml Inj)  40 mg SUB-Q DAILY [facility]; Protocol


   Last Admin: 06/13/21 10:20 Dose:  40 mg


   Documented by: 


Famotidine (Famotidine 20 Mg/2 Ml Inj)  20 mg IV BID [facility]


   Last Admin: 06/13/21 10:20 Dose:  20 mg


   Documented by: 


Magnesium Hydroxide (Magnesium Hydroxide (Mom) Oral Liqd Udc)  30 ml PO Q4H PRN


   PRN Reason: Constipation


Methylprednisolone Sodium Succinate (Methylprednisolone Sod Succinate 125 Mg/2 

Ml Inj)  60 mg IV Q8HR [facility]


Ondansetron HCl (Ondansetron 4 Mg/2 Ml Inj)  4 mg IV Q8H PRN


   PRN Reason: Nausea And Vomiting


Senna (Sennosides 8.6 Mg Tab)  8.6 mg PO Q12HR PRN


   PRN Reason: Constipation


Sodium Chloride (Sodium Chloride 0.9% 10 Ml Flush Syringe)  10 ml IV BID [facility]


   Last Admin: 06/13/21 10:20 Dose:  10 ml


   Documented by: 


Sodium Chloride (Sodium Chloride 0.9% 10 Ml Flush Syringe)  10 ml IV PRN PRN


   PRN Reason: LINE FLUSH











Past History


Past Medical History: COPD


Past Surgical History: appendectomy, hysterectomy, tonsillectomy


Social history: smoking, full code.  denies: alcohol abuse, prescription drug 

abuse, IV drug use


Family history: no significant family history (no pulm issues reported)





Medications and Allergies


                                    Allergies











Allergy/AdvReac Type Severity Reaction Status Date / Time


 


steroids AdvReac  Unknown Uncoded 04/17/14 14:28











                                Home Medications











 Medication  Instructions  Recorded  Confirmed  Last Taken  Type


 


Baclofen 20 mg PO Q8H PRN 04/17/14 06/13/21 Unknown History


 


Citalopram [Celexa] 20 mg PO DAILY 04/17/14 06/13/21 Unknown History


 


Pregabalin [Lyrica] 150 mg PO BID 04/17/14 06/13/21 Unknown History


 


traMADoL [Ultram 50 MG tab] 50 mg PO Q8H PRN 04/17/14 06/13/21 Unknown History


 


Naproxen [Naprosyn] 500 mg PO BID PRN 5 Days #10 tablet 07/28/20 06/13/21 Unkn

own Rx


 


methOCARBAMOL [Robaxin TAB] 1,500 mg PO Q8H PRN #22 tablet 07/28/20 06/13/21 

Unknown Rx











Active Meds: 


Active Medications





Acetaminophen (Acetaminophen 325 Mg Tab)  650 mg PO Q4H PRN


   PRN Reason: Pain MILD(1-3)/Fever >100.5/HA


Al Hydrox/Mg Hydrox/Simethicone (Alum-Mag Hydroxide-Simethicone [phone]/5ml

Oral Liqd 30 Ml)  30 ml PO Q4H PRN


   PRN Reason: Indigestion


Enoxaparin Sodium (Enoxaparin 40 Mg/0.4 Ml Inj)  40 mg SUB-Q DAILY [facility]; Protocol


   Last Admin: 06/13/21 10:20 Dose:  40 mg


   Documented by: 


Famotidine (Famotidine 20 Mg/2 Ml Inj)  20 mg IV BID [facility]


   Last Admin: 06/13/21 10:20 Dose:  20 mg


   Documented by: 


Magnesium Hydroxide (Magnesium Hydroxide (Mom) Oral Liqd Udc)  30 ml PO Q4H PRN


   PRN Reason: Constipation


Ondansetron HCl (Ondansetron 4 Mg/2 Ml Inj)  4 mg IV Q8H PRN


   PRN Reason: Nausea And Vomiting


Senna (Sennosides 8.6 Mg Tab)  8.6 mg PO Q12HR PRN


   PRN Reason: Constipation


Sodium Chloride (Sodium Chloride 0.9% 10 Ml Flush Syringe)  10 ml IV BID [facility]


   Last Admin: 06/13/21 10:20 Dose:  10 ml


   Documented by: 


Sodium Chloride (Sodium Chloride 0.9% 10 Ml Flush Syringe)  10 ml IV PRN PRN


   PRN Reason: LINE FLUSH











Review of Systems


All systems: negative





Physical Examination


Vital signs: 


                                   Vital Signs











Temp Pulse Resp BP Pulse Ox


 


 99.4 F   86   30 H  111/70   95 


 


 06/12/21 14:56  06/12/21 14:56  06/12/21 14:56  06/12/21 14:56  06/12/21 14:56











General appearance: no acute distress, alert


Eyes: non-icteric


ENT: oropharynx moist


Neck: supple


Effort: normal


Ascultation: Bilateral: wheezes (bronchospastic coughing)


Cardiovascular: regular rate and rhythm (no mrg)


Gastrointestinal: normoactive bowel sounds, soft, non-distended


Integumentary: normal


Extremities: no cyanosis, no edema, pink and warm


Musculoskeletal: no deformities


normal mental status, non-focal exam, pupils equal and round, CN II-XII normal


mood appropriate, affect normal





Results





- Laboratory Findings


CBC and BMP: 


                                 06/12/21 17:04





                                 06/13/21 01:55


PT/INR, D-dimer











PT  13.1 Sec. (12.2-14.9)   06/12/21  17:04    


 


INR  0.93  (0.87-1.13)   06/12/21  17:04    


 


D-Dimer  1245.04 ng/mlDDU (0-234)  H  06/13/21  01:55    








Abnormal lab findings: 


                                  Abnormal Labs











  06/12/21 06/12/21 06/13/21





  17:04 17:04 01:55


 


Hgb  16.0 H  


 


Hct  46.9 H  


 


Mono % (Auto)  10.8 H  


 


Mono # (Auto)  1.0 H  


 


D-Dimer    1245.04 H


 


Potassium   5.5 H 


 


Glucose   


 


C-Reactive Protein   














  06/13/21





  01:55


 


Hgb 


 


Hct 


 


Mono % (Auto) 


 


Mono # (Auto) 


 


D-Dimer 


 


Potassium 


 


Glucose  177 H


 


C-Reactive Protein  2.40 H














- Diagnostic Findings


Chest x-ray: report reviewed, image reviewed


CT scan - chest: report reviewed, image reviewed





Assessment and Plan





Imp:


1. Centrilobular emphysema


2. COPD exac.


3. Chronic nicotine dependence, cigarettes


4. Pulm nodules


5. Acute bronchitis


6. Polycythemia





Rec:


1. Add Solumedrol IV and Duonebs


2. ABX per ID


3. ELTON nodule is too small for intervention and will require f/u CT chest as 

outpatient


4. RUL nodule is very suspicious for primary lung malignancy; once her 

respiratory status improves I would check with IR to see if they are willing to 

do a CT guided biopsy prior to d/c; if they are not willing the best option for 

tissue diagnosis would be outpatient electromagnetic navigational bronchoscopy 

or radial EBUS (regular bronch will be low-yield); the latter procedure would 

likely need to be done at [place] given her lack of funding





Plan of care reviewed w/ patient, she understands/agrees

## 2021-06-14 LAB
ALBUMIN SERPL-MCNC: 3.8 G/DL (ref 3.9–5)
ALT SERPL-CCNC: 8 UNITS/L (ref 7–56)
ANISOCYTOSIS BLD QL SMEAR: (no result)
BAND NEUTROPHILS # (MANUAL): 0 K/MM3
BUN SERPL-MCNC: 14 MG/DL (ref 7–17)
BUN SERPL-MCNC: 14 MG/DL (ref 7–17)
BUN/CREAT SERPL: 20 %
BUN/CREAT SERPL: 20 %
CALCIUM SERPL-MCNC: 8.6 MG/DL (ref 8.4–10.2)
CALCIUM SERPL-MCNC: 8.6 MG/DL (ref 8.4–10.2)
HCT VFR BLD CALC: 40.2 % (ref 30.3–42.9)
HEMOLYSIS INDEX: 13
HEMOLYSIS INDEX: 4
HGB BLD-MCNC: 13.5 GM/DL (ref 10.1–14.3)
MCHC RBC AUTO-ENTMCNC: 34 % (ref 30–34)
MCV RBC AUTO: 93 FL (ref 79–97)
MYELOCYTES # (MANUAL): 0 K/MM3
PLATELET # BLD: 165 K/MM3 (ref 140–440)
PROMYELOCYTES # (MANUAL): 0 K/MM3
RBC # BLD AUTO: 4.33 M/MM3 (ref 3.65–5.03)
TOTAL CELLS COUNTED BLD: 100

## 2021-06-14 RX ADMIN — METHYLPREDNISOLONE SODIUM SUCCINATE SCH MG: 125 INJECTION, POWDER, FOR SOLUTION INTRAMUSCULAR; INTRAVENOUS at 05:45

## 2021-06-14 RX ADMIN — IPRATROPIUM BROMIDE AND ALBUTEROL SULFATE SCH AMPUL: .5; 3 SOLUTION RESPIRATORY (INHALATION) at 12:21

## 2021-06-14 RX ADMIN — IPRATROPIUM BROMIDE AND ALBUTEROL SULFATE SCH AMPUL: .5; 3 SOLUTION RESPIRATORY (INHALATION) at 15:45

## 2021-06-14 RX ADMIN — FAMOTIDINE SCH MG: 10 INJECTION, SOLUTION INTRAVENOUS at 22:00

## 2021-06-14 RX ADMIN — IPRATROPIUM BROMIDE AND ALBUTEROL SULFATE SCH AMPUL: .5; 3 SOLUTION RESPIRATORY (INHALATION) at 20:46

## 2021-06-14 RX ADMIN — Medication SCH ML: at 09:44

## 2021-06-14 RX ADMIN — IPRATROPIUM BROMIDE AND ALBUTEROL SULFATE SCH AMPUL: .5; 3 SOLUTION RESPIRATORY (INHALATION) at 08:14

## 2021-06-14 RX ADMIN — ENOXAPARIN SODIUM SCH MG: 100 INJECTION SUBCUTANEOUS at 09:40

## 2021-06-14 RX ADMIN — METHYLPREDNISOLONE SODIUM SUCCINATE SCH MG: 125 INJECTION, POWDER, FOR SOLUTION INTRAMUSCULAR; INTRAVENOUS at 21:59

## 2021-06-14 RX ADMIN — METHYLPREDNISOLONE SODIUM SUCCINATE SCH MG: 125 INJECTION, POWDER, FOR SOLUTION INTRAMUSCULAR; INTRAVENOUS at 13:16

## 2021-06-14 RX ADMIN — Medication SCH ML: at 22:00

## 2021-06-14 RX ADMIN — FAMOTIDINE SCH MG: 10 INJECTION, SOLUTION INTRAVENOUS at 09:40

## 2021-06-14 NOTE — PROGRESS NOTE
Assessment and Plan





Cultures:


SARS CoV2 PCR: Negative








A/P:


55-year-old female with COPD admitted with shortness of breath, cough for 1 

week:





#COVID-19 PUI: PCR negative.  Inflammatory markers are not elevated. CXR without

pneumonia. 





#Acute hypoxic respiratory failure





#COPD





#Spiculated lung lesion: Evaluation per primary team








Recs:


Leukocytosis likely secondary to steroids


Work-up for lung lesion per primary/pulmonary





Infectious disease will sign off, please call with questions.





OTIS Garcia MD


[facility] ([facility]


O: [phone]


F: [phone]





Subjective


Date of service: 06/14/21


Interval history: 





Afebrile, white count 11.8 Covid PCR negative.  Normal procalcitonin.





Objective





- Exam


Narrative Exam: 





Physical exam deferred to reduce risk of transmission of COVID-19.  Please refer

to primary team's note.





- Constitutional


Vitals: 


                                   Vital Signs











Temp Pulse Resp BP Pulse Ox


 


 98.4 F   85   18   107/68   89 


 


 06/14/21 11:32  06/14/21 12:22  06/14/21 12:22  06/14/21 11:32  06/14/21 11:32








                           Temperature -Last 24 Hours











Temperature                    98.4 F


 


Temperature                    98.5 F


 


Temperature                    97.7 F


 


Temperature                    98.5 F


 


Temperature                    98.6 F


 


Temperature                    98.5 F

















- Labs


CBC & Chem 7: 


                                 06/14/21 05:26





                                 06/14/21 05:26


Labs: 


                              Abnormal lab results











  06/14/21 06/14/21 06/14/21 Range/Units





  05:26 05:26 05:26 


 


WBC  11.8 H    (4.5-11.0)  K/mm3


 


Seg Neuts % (Manual)  97.0 H    (40.0-70.0)  %


 


Lymphocytes % (Manual)  3.0 L    (13.4-35.0)  %


 


Seg Neutrophils # Man  11.4 H    (1.8-7.7)  K/mm3


 


Lymphocytes # (Manual)  0.4 L    (1.2-5.4)  K/mm3


 


Glucose   138 H  137 H  ()  mg/dL


 


Albumin   3.8 L   (3.9-5)  g/dL

## 2021-06-14 NOTE — PROGRESS NOTE
Assessment and Plan





56-year-old female admitted to the hospital for shortness of breath due to COPD 

exacerbation and possible right lung carcinoma.





 A/P


-- COPD exacerbation


Continue respiratory care


Bronchodilator and oxygen supplement


Pulmonologist consulted








-- Lung mass


Lung mass appreciated on CT of the chest


Pulmonologist consulted and recommended CT-guided biopsy when clinically much 

stable


Monitor ABG and oxygen supplement








--Acute respiratory failure with hypoxia


Hypoxia likely secondary to emphysema and lung mass


Continue oxygen supplements





--Covid PUI, test negative





--Leukocytosis, likely due to steroid-induced








-- Tobacco abuse


Patient admits tobacco use 1 pack daily 


discussed tobacco use cessation





--History of cocaine abuse, counseled for cessation





-- Elevated d-dimer


CT of the chest is negative for PE


Bilateral leg ultrasound ordered and negative for DVT








-- DVT prophylaxis


Subcutaneous Lovenox





Daily clinical course:


6/14/21: Continue empiric steroid, antibiotic, nebulizer breathing treatment for

COPD exacerbation, continue supplemental O2 and wean off as tolerated.  Will 

consult IR for possible CT-guided biopsy of the right lung mass. If they are not

willing the best option for tissue diagnosis would be outpatient electromagnetic

navigational bronchoscopy or radial EBUS (regular bronch will be low-yield); the

latter procedure would likely need to be done at [place] given her lack of 

funding.











Subjective


Date of service: 06/14/21


Interval history: 





Patient seen and examined.  Medical records and medication list reviewed.  


No acute event overnight noted by the RN.  


Patient complains of difficulty breathing with very minimal exertion.  Patient 

is tolerating diet.  


Discussed plan of care at bedside with patient.








Objective





- Exam


Narrative Exam: 





GENERAL: Ill-looking white female lying on bed appeared to be in no discomfort. 


HEENT: Normocephalic.  Atraumatic.  No conjunctival congestion or icterus. 

Patient has moist mucous membranes.


NECK: Supple.  Trachea midline.


CHEST/LUNGS: Diminished breath sound auscultated bilaterally, breathing 

nonlabored.  Patient on 3 L nasal cannula


HEART/CARDIOVASCULAR: Regular in rate and rhythm.  S1 and S2 positive.


ABDOMEN: Abdomen is soft, nontender.  Patient has normal bowel sounds.  


SKIN: There is no rash.  Warm and dry.


NEURO:  No focal motor deficit.  Follows command.


MUSCULOSKELETAL: No joint effusion or tenderness.


EXTRIMITY: No edema, no cyanosis or clubbing.


PSYCH:  Cooperative.





- Constitutional


Vitals: 


                               Vital Signs - 12hr











  06/14/21 06/14/21 06/14/21





  04:47 04:53 08:17


 


Temperature 97.7 F 98.5 F 


 


Pulse Rate 67 80 


 


Pulse Rate [   83





Bilateral   





Throughout]   


 


Respiratory 18 20 





Rate   


 


Respiratory   18





Rate [Bilateral   





Throughout]   


 


Blood Pressure 115/69 174/84 


 


O2 Sat by Pulse 91 97 





Oximetry   














  06/14/21 06/14/21





  11:32 12:22


 


Temperature 98.4 F 


 


Pulse Rate 89 


 


Pulse Rate [  85





Bilateral  





Throughout]  


 


Respiratory 20 





Rate  


 


Respiratory  18





Rate [Bilateral  





Throughout]  


 


Blood Pressure 107/68 


 


O2 Sat by Pulse 89 





Oximetry  














- Labs


CBC & Chem 7: 


                                 06/14/21 05:26





                                 06/14/21 05:26


Labs: 


                              Abnormal lab results











  06/14/21 06/14/21 06/14/21 Range/Units





  05:26 05:26 05:26 


 


WBC  11.8 H    (4.5-11.0)  K/mm3


 


Seg Neuts % (Manual)  97.0 H    (40.0-70.0)  %


 


Lymphocytes % (Manual)  3.0 L    (13.4-35.0)  %


 


Seg Neutrophils # Man  11.4 H    (1.8-7.7)  K/mm3


 


Lymphocytes # (Manual)  0.4 L    (1.2-5.4)  K/mm3


 


Glucose   138 H  137 H  ()  mg/dL


 


Albumin   3.8 L   (3.9-5)  g/dL














HEART Score





- HEART Score


Troponin: 


                                        











Troponin T  < 0.010 ng/mL (0.00-0.029)   06/12/21  22:53

## 2021-06-14 NOTE — PROGRESS NOTE
Assessment and Plan





57 y/o female with acute exacerbation of COPD with bilateral upper lobe nodules,

concerning for malignancy.





1.  Continue steroids at current dosing, likely not ready for oral therapy yet


2.  Will add BID pulmicort


3.  continue schedule duonebs.


4.  Will continue to follow.





Subjective


Date of service: 06/14/21


Interval history: 





No acute events.  





Objective


                               Vital Signs - 12hr











  06/14/21 06/14/21





  04:47 08:17


 


Temperature 97.7 F 


 


Pulse Rate 67 


 


Pulse Rate [  83





Bilateral  





Throughout]  


 


Respiratory 18 





Rate  


 


Respiratory  18





Rate [Bilateral  





Throughout]  


 


Blood Pressure 115/69 


 


O2 Sat by Pulse 91 





Oximetry  











Constitutional: no acute distress, alert


Eyes: non-icteric


ENT: oropharynx moist


Neck: supple


Effort: normal


Ascultation: Bilateral: wheezes (bronchospastic coughing)


Cardiovascular: regular rate and rhythm (no mrg)


Gastrointestinal: normoactive bowel sounds, soft, non-distended


Integumentary: normal


Extremities: no cyanosis, no edema, pink and warm


Neurologic: normal mental status, non-focal exam, pupils equal and round, CN II-

XII normal


Psychiatric: mood appropriate, affect normal


CBC and BMP: 


                                 06/14/21 05:26





                                 06/14/21 05:26


ABG, PT/INR, D-dimer: 


PT/INR, D-dimer











PT  13.1 Sec. (12.2-14.9)   06/12/21  17:04    


 


INR  0.93  (0.87-1.13)   06/12/21  17:04    


 


D-Dimer  1245.04 ng/mlDDU (0-234)  H  06/13/21  01:55    








Abnormal lab findings: 


                                  Abnormal Labs











  06/12/21 06/12/21 06/13/21





  17:04 17:04 01:55


 


WBC   


 


Hgb  16.0 H  


 


Hct  46.9 H  


 


Mono % (Auto)  10.8 H  


 


Mono # (Auto)  1.0 H  


 


Seg Neuts % (Manual)   


 


Lymphocytes % (Manual)   


 


Seg Neutrophils # Man   


 


Lymphocytes # (Manual)   


 


D-Dimer    1245.04 H


 


Potassium   5.5 H 


 


Glucose   


 


C-Reactive Protein   


 


Albumin   














  06/13/21 06/14/21 06/14/21





  01:55 05:26 05:26


 


WBC   11.8 H 


 


Hgb   


 


Hct   


 


Mono % (Auto)   


 


Mono # (Auto)   


 


Seg Neuts % (Manual)   97.0 H 


 


Lymphocytes % (Manual)   3.0 L 


 


Seg Neutrophils # Man   11.4 H 


 


Lymphocytes # (Manual)   0.4 L 


 


D-Dimer   


 


Potassium   


 


Glucose  177 H   138 H


 


C-Reactive Protein  2.40 H  


 


Albumin    3.8 L














  06/14/21





  05:26


 


WBC 


 


Hgb 


 


Hct 


 


Mono % (Auto) 


 


Mono # (Auto) 


 


Seg Neuts % (Manual) 


 


Lymphocytes % (Manual) 


 


Seg Neutrophils # Man 


 


Lymphocytes # (Manual) 


 


D-Dimer 


 


Potassium 


 


Glucose  137 H


 


C-Reactive Protein 


 


Albumin

## 2021-06-15 RX ADMIN — Medication SCH ML: at 10:15

## 2021-06-15 RX ADMIN — IPRATROPIUM BROMIDE AND ALBUTEROL SULFATE SCH AMPUL: .5; 3 SOLUTION RESPIRATORY (INHALATION) at 08:13

## 2021-06-15 RX ADMIN — METHYLPREDNISOLONE SODIUM SUCCINATE SCH MG: 125 INJECTION, POWDER, FOR SOLUTION INTRAMUSCULAR; INTRAVENOUS at 22:37

## 2021-06-15 RX ADMIN — BUDESONIDE SCH MG: 0.5 INHALANT RESPIRATORY (INHALATION) at 08:13

## 2021-06-15 RX ADMIN — IPRATROPIUM BROMIDE AND ALBUTEROL SULFATE SCH AMPUL: .5; 3 SOLUTION RESPIRATORY (INHALATION) at 14:03

## 2021-06-15 RX ADMIN — FAMOTIDINE SCH MG: 10 INJECTION, SOLUTION INTRAVENOUS at 10:15

## 2021-06-15 RX ADMIN — GUAIFENESIN SYRUP AND DEXTROMETHORPHAN PRN ML: 100; 10 SYRUP ORAL at 23:07

## 2021-06-15 RX ADMIN — ENOXAPARIN SODIUM SCH MG: 100 INJECTION SUBCUTANEOUS at 10:15

## 2021-06-15 RX ADMIN — INSULIN LISPRO SCH: 100 INJECTION, SOLUTION INTRAVENOUS; SUBCUTANEOUS at 22:31

## 2021-06-15 RX ADMIN — FAMOTIDINE SCH MG: 10 INJECTION, SOLUTION INTRAVENOUS at 22:38

## 2021-06-15 RX ADMIN — METHYLPREDNISOLONE SODIUM SUCCINATE SCH MG: 125 INJECTION, POWDER, FOR SOLUTION INTRAMUSCULAR; INTRAVENOUS at 13:28

## 2021-06-15 RX ADMIN — Medication SCH ML: at 22:38

## 2021-06-15 RX ADMIN — IPRATROPIUM BROMIDE AND ALBUTEROL SULFATE SCH AMPUL: .5; 3 SOLUTION RESPIRATORY (INHALATION) at 20:36

## 2021-06-15 RX ADMIN — BUDESONIDE SCH MG: 0.5 INHALANT RESPIRATORY (INHALATION) at 20:36

## 2021-06-15 RX ADMIN — METHYLPREDNISOLONE SODIUM SUCCINATE SCH MG: 125 INJECTION, POWDER, FOR SOLUTION INTRAMUSCULAR; INTRAVENOUS at 06:11

## 2021-06-15 NOTE — PROGRESS NOTE
Assessment and Plan





55 y/o female with acute exacerbation of COPD with bilateral upper lobe nodules,

concerning for malignancy.





6/15/21:  Stable on 2 liters.  Suggest walk test off oxygen to assess if O2 

therapy is needed.  Would change to prednisone 60 daily and taper as follows:  

60 daily for 3 days, 40 daily for 3 days, 20 daily for 3 days, then 10 daily for

3 days then stop.  Agree with my partner, please see his consult not.  Given her

funding she would need to have her malignancy work at [facility].





1.  Continue steroids at current dosing, likely not ready for oral therapy yet


2.  Will add BID pulmicort


3.  continue schedule duonebs.


4.  Will continue to follow.





Subjective


Date of service: 06/15/21


Interval history: 





STable on 2 liters.





Objective


                               Vital Signs - 12hr











  06/14/21 06/14/21 06/14/21





  20:49 20:50 21:42


 


Temperature   98.9 F


 


Pulse Rate   99 H


 


Pulse Rate [ 93 H  





Bilateral   





Throughout]   


 


Respiratory   18





Rate   


 


Respiratory 20  





Rate [Bilateral   





Throughout]   


 


Blood Pressure   107/64


 


O2 Sat by Pulse  94 91





Oximetry   














  06/15/21 06/15/21





  00:00 04:59


 


Temperature  98.9 F


 


Pulse Rate  77


 


Pulse Rate [  





Bilateral  





Throughout]  


 


Respiratory 18 18





Rate  


 


Respiratory  





Rate [Bilateral  





Throughout]  


 


Blood Pressure  113/70


 


O2 Sat by Pulse  90





Oximetry  











Constitutional: no acute distress, alert


Eyes: non-icteric


ENT: oropharynx moist


Neck: supple


Effort: normal


Ascultation: Bilateral: wheezes (bronchospastic coughing)


Cardiovascular: regular rate and rhythm (no mrg)


Gastrointestinal: normoactive bowel sounds, soft, non-distended


Integumentary: normal


Extremities: no cyanosis, no edema, pink and warm


Neurologic: normal mental status, non-focal exam, pupils equal and round, CN II-

XII normal


Psychiatric: mood appropriate, affect normal


CBC and BMP: 


                                 06/14/21 05:26





                                 06/14/21 05:26


ABG, PT/INR, D-dimer: 


PT/INR, D-dimer











PT  13.1 Sec. (12.2-14.9)   06/12/21  17:04    


 


INR  0.93  (0.87-1.13)   06/12/21  17:04    


 


D-Dimer  1245.04 ng/mlDDU (0-234)  H  06/13/21  01:55    








Abnormal lab findings: 


                                  Abnormal Labs











  06/12/21 06/12/21 06/13/21





  17:04 17:04 01:55


 


WBC   


 


Hgb  16.0 H  


 


Hct  46.9 H  


 


Mono % (Auto)  10.8 H  


 


Mono # (Auto)  1.0 H  


 


Seg Neuts % (Manual)   


 


Lymphocytes % (Manual)   


 


Seg Neutrophils # Man   


 


Lymphocytes # (Manual)   


 


D-Dimer    1245.04 H


 


Potassium   5.5 H 


 


Glucose   


 


POC Glucose   


 


C-Reactive Protein   


 


Albumin   














  06/13/21 06/14/21 06/14/21





  01:55 05:26 05:26


 


WBC   11.8 H 


 


Hgb   


 


Hct   


 


Mono % (Auto)   


 


Mono # (Auto)   


 


Seg Neuts % (Manual)   97.0 H 


 


Lymphocytes % (Manual)   3.0 L 


 


Seg Neutrophils # Man   11.4 H 


 


Lymphocytes # (Manual)   0.4 L 


 


D-Dimer   


 


Potassium   


 


Glucose  177 H   138 H


 


POC Glucose   


 


C-Reactive Protein  2.40 H  


 


Albumin    3.8 L














  06/14/21 06/14/21





  05:26 21:48


 


WBC  


 


Hgb  


 


Hct  


 


Mono % (Auto)  


 


Mono # (Auto)  


 


Seg Neuts % (Manual)  


 


Lymphocytes % (Manual)  


 


Seg Neutrophils # Man  


 


Lymphocytes # (Manual)  


 


D-Dimer  


 


Potassium  


 


Glucose  137 H 


 


POC Glucose   257 H


 


C-Reactive Protein  


 


Albumin

## 2021-06-15 NOTE — PROGRESS NOTE
Assessment and Plan


Assessment and plan: 





56-year-old female admitted to the hospital for shortness of breath due to COPD 

exacerbation and possible right lung carcinoma.





 A/P


-- COPD exacerbation


Continue respiratory care


Bronchodilator and oxygen supplement


Pulmonologist consulted








-- Lung mass


Lung mass appreciated on CT of the chest


Pulmonologist consulted and recommended CT-guided biopsy when clinically much 

stable


Monitor ABG and oxygen supplement








--Acute respiratory failure with hypoxia


Hypoxia likely secondary to emphysema and lung mass


Continue oxygen supplements





--Covid PUI, test negative





--Leukocytosis, likely due to steroid-induced








-- Tobacco abuse


Patient admits tobacco use 1 pack daily 


discussed tobacco use cessation





--History of cocaine abuse, counseled for cessation





-- Elevated d-dimer


CT of the chest is negative for PE


Bilateral leg ultrasound ordered and negative for DVT








-- DVT prophylaxis


Subcutaneous Lovenox





Daily clinical course:


6/14/21: Continue empiric steroid, antibiotic, nebulizer breathing treatment for

COPD exacerbation, continue supplemental O2 and wean off as tolerated.  Will 

consult IR for possible CT-guided biopsy of the right lung mass. If they are not

willing the best option for tissue diagnosis would be outpatient electromagnetic

navigational bronchoscopy or radial EBUS (regular bronch will be low-yield); the

latter procedure would likely need to be done at [place] given her lack of 

funding.





6/15/2021; continue current treatment for COPD exacerbation.  Patient is on 2 L 

of oxygen.  We will do 6-minute walk.  Patient was evaluated by pulmonary.  

Recommended to have work-up for malignancy at [facility].  Patient is not intubated. 

Will discharge once stable with tappering dose of steroids.





History


Interval history: 





Patient was seen and evaluated this morning








Hospitalist Physical





- Physical exam


Narrative exam: 





 Not in cardiopulmonary distress. 


 The patient appeared well nourished and normally developed.


 Vital signs as documented.


 Head exam is unremarkable.


 No scleral icterus .


 Neck is without jugular venous distension, thyromegaly, or carotid bruits. 


 Lungs are clear to auscultation.


Cardiac exam reveals regular rate and  Rhythm. 


Abdominal exam reveals normal bowel sounds, nontender, no organomegaly.


Extremities are nonedematous and both femoral and pedal pulses are normal.


CNS: Alert and oriented 3.  No focal weakness.





- Constitutional


Vitals: 


                                        











Temp Pulse Resp BP Pulse Ox


 


 98.9 F   77   18   113/70   90 


 


 06/15/21 04:59  06/15/21 04:59  06/15/21 04:59  06/15/21 04:59  06/15/21 04:59











General appearance: Present: mild distress, well-nourished





HEART Score





- HEART Score


Troponin: 


                                        











Troponin T  < 0.010 ng/mL (0.00-0.029)   06/12/21  22:53    














Results





- Labs


CBC & Chem 7: 


                                 06/14/21 05:26





                                 06/14/21 05:26


Labs: 


                             Laboratory Last Values











WBC  11.8 K/mm3 (4.5-11.0)  H  06/14/21  05:26    


 


RBC  4.33 M/mm3 (3.65-5.03)   06/14/21  05:26    


 


Hgb  13.5 gm/dl (10.1-14.3)   06/14/21  05:26    


 


Hct  40.2 % (30.3-42.9)  D 06/14/21  05:26    


 


MCV  93 fl (79-97)   06/14/21  05:26    


 


MCH  31 pg (28-32)   06/14/21  05:26    


 


MCHC  34 % (30-34)   06/14/21  05:26    


 


RDW  13.3 % (13.2-15.2)   06/14/21  05:26    


 


Plt Count  165 K/mm3 (140-440)   06/14/21  05:26    


 


Lymph % (Auto)  18.6 % (13.4-35.0)   06/12/21  17:04    


 


Mono % (Auto)  10.8 % (0.0-7.3)  H  06/12/21  17:04    


 


Eos % (Auto)  1.8 % (0.0-4.3)   06/12/21  17:04    


 


Baso % (Auto)  0.8 % (0.0-1.8)   06/12/21  17:04    


 


Lymph # (Auto)  1.8 K/mm3 (1.2-5.4)   06/12/21  17:04    


 


Mono # (Auto)  1.0 K/mm3 (0.0-0.8)  H  06/12/21  17:04    


 


Eos # (Auto)  0.2 K/mm3 (0.0-0.4)   06/12/21  17:04    


 


Baso # (Auto)  0.1 K/mm3 (0.0-0.1)   06/12/21  17:04    


 


Add Manual Diff  Complete   06/14/21  05:26    


 


Total Counted  100   06/14/21  05:26    


 


Seg Neutrophils %  Np   06/14/21  05:26    


 


Seg Neuts % (Manual)  97.0 % (40.0-70.0)  H  06/14/21  05:26    


 


Lymphocytes % (Manual)  3.0 % (13.4-35.0)  L  06/14/21  05:26    


 


Nucleated RBC %  Not Reportable   06/14/21  05:26    


 


Seg Neutrophils #  6.4 K/mm3 (1.8-7.7)   06/12/21  17:04    


 


Seg Neutrophils # Man  11.4 K/mm3 (1.8-7.7)  H  06/14/21  05:26    


 


Band Neutrophils #  0.0 K/mm3  06/14/21  05:26    


 


Lymphocytes # (Manual)  0.4 K/mm3 (1.2-5.4)  L  06/14/21  05:26    


 


Abs React Lymphs (Man)  0.0 K/mm3  06/14/21  05:26    


 


Monocytes # (Manual)  0.0 K/mm3 (0.0-0.8)   06/14/21  05:26    


 


Eosinophils # (Manual)  0.0 K/mm3 (0.0-0.4)   06/14/21  05:26    


 


Basophils # (Manual)  0.0 K/mm3 (0.0-0.1)   06/14/21  05:26    


 


Metamyelocytes #  0.0 K/mm3  06/14/21  05:26    


 


Myelocytes #  0.0 K/mm3  06/14/21  05:26    


 


Promyelocytes #  0.0 K/mm3  06/14/21  05:26    


 


Blast Cells #  0.0 K/mm3  06/14/21  05:26    


 


WBC Morphology  Not Reportable   06/14/21  05:26    


 


Hypersegmented Neuts  Not Reportable   06/14/21  05:26    


 


Hyposegmented Neuts  Not Reportable   06/14/21  05:26    


 


Hypogranular Neuts  Not Reportable   06/14/21  05:26    


 


Smudge Cells  Not Reportable   06/14/21  05:26    


 


Toxic Granulation  Not Reportable   06/14/21  05:26    


 


Toxic Vacuolation  Not Reportable   06/14/21  05:26    


 


Dohle Bodies  Not Reportable   06/14/21  05:26    


 


Pelger-Huet Anomaly  Not Reportable   06/14/21  05:26    


 


Hector Rods  Not Reportable   06/14/21  05:26    


 


Platelet Estimate  Consistent w auto   06/14/21  05:26    


 


Clumped Platelets  Not Reportable   06/14/21  05:26    


 


Plt Clumps, EDTA  Not Reportable   06/14/21  05:26    


 


Large Platelets  Not Reportable   06/14/21  05:26    


 


Giant Platelets  Not Reportable   06/14/21  05:26    


 


Platelet Satelliting  Not Reportable   06/14/21  05:26    


 


Plt Morphology Comment  Not Reportable   06/14/21  05:26    


 


RBC Morphology  Not Reportable   06/14/21  05:26    


 


Dimorphic RBCs  Not Reportable   06/14/21  05:26    


 


Polychromasia  Not Reportable   06/14/21  05:26    


 


Hypochromasia  Not Reportable   06/14/21  05:26    


 


Poikilocytosis  Not Reportable   06/14/21  05:26    


 


Anisocytosis  1+   06/14/21  05:26    


 


Microcytosis  Not Reportable   06/14/21  05:26    


 


Macrocytosis  Not Reportable   06/14/21  05:26    


 


Spherocytes  Not Reportable   06/14/21  05:26    


 


Pappenheimer Bodies  Not Reportable   06/14/21  05:26    


 


Sickle Cells  Not Reportable   06/14/21  05:26    


 


Target Cells  Not Reportable   06/14/21  05:26    


 


Tear Drop Cells  Not Reportable   06/14/21  05:26    


 


Ovalocytes  Not Reportable   06/14/21  05:26    


 


Helmet Cells  Not Reportable   06/14/21  05:26    


 


Green-Clinton Bodies  Not Reportable   06/14/21  05:26    


 


Cabot Rings  Not Reportable   06/14/21  05:26    


 


Lucas Cells  Not Reportable   06/14/21  05:26    


 


Bite Cells  Not Reportable   06/14/21  05:26    


 


Crenated Cell  Not Reportable   06/14/21  05:26    


 


Elliptocytes  Not Reportable   06/14/21  05:26    


 


Acanthocytes (Spur)  Not Reportable   06/14/21  05:26    


 


Rouleaux  Not Reportable   06/14/21  05:26    


 


Hemoglobin C Crystals  Not Reportable   06/14/21  05:26    


 


Schistocytes  Not Reportable   06/14/21  05:26    


 


Malaria parasites  Not Reportable   06/14/21  05:26    


 


Anjel Bodies  Not Reportable   06/14/21  05:26    


 


Hem Pathologist Commnt  No   06/14/21  05:26    


 


PT  13.1 Sec. (12.2-14.9)   06/12/21  17:04    


 


INR  0.93  (0.87-1.13)   06/12/21  17:04    


 


APTT  28.1 Sec. (24.2-36.6)   06/12/21  17:04    


 


D-Dimer  1245.04 ng/mlDDU (0-234)  H  06/13/21  01:55    


 


Sodium  139 mmol/L (137-145)   06/14/21  05:26    


 


Sodium  140 mmol/L (137-145)   06/14/21  05:26    


 


Potassium  4.0 mmol/L (3.6-5.0)  D 06/14/21  05:26    


 


Potassium  4.1 mmol/L (3.6-5.0)   06/14/21  05:26    


 


Chloride  101.2 mmol/L ()   06/14/21  05:26    


 


Chloride  102.0 mmol/L ()   06/14/21  05:26    


 


Carbon Dioxide  28 mmol/L (22-30)   06/14/21  05:26    


 


Carbon Dioxide  29 mmol/L (22-30)   06/14/21  05:26    


 


Anion Gap  13 mmol/L  06/14/21  05:26    


 


Anion Gap  14 mmol/L  06/14/21  05:26    


 


BUN  14 mg/dL (7-17)   06/14/21  05:26    


 


BUN  14 mg/dL (7-17)   06/14/21  05:26    


 


Creatinine  0.7 mg/dL (0.6-1.2)   06/14/21  05:26    


 


Creatinine  0.7 mg/dL (0.6-1.2)   06/14/21  05:26    


 


Estimated GFR  > 60 ml/min  06/14/21  05:26    


 


Estimated GFR  > 60 ml/min  06/14/21  05:26    


 


BUN/Creatinine Ratio  20 %  06/14/21  05:26    


 


BUN/Creatinine Ratio  20 %  06/14/21  05:26    


 


Glucose  137 mg/dL ()  H  06/14/21  05:26    


 


Glucose  138 mg/dL ()  H  06/14/21  05:26    


 


POC Glucose  257 mg/dL ()  H  06/14/21  21:48    


 


Hemoglobin A1c  5.7 % (4-6)   06/13/21  07:00    


 


Calcium  8.6 mg/dL (8.4-10.2)   06/14/21  05:26    


 


Calcium  8.6 mg/dL (8.4-10.2)   06/14/21  05:26    


 


Magnesium  2.30 mg/dL (1.7-2.3)   06/13/21  07:00    


 


Ferritin  50.4 ng/mL (10.0-200.0)   06/13/21  01:55    


 


Total Bilirubin  < 0.20 mg/dL (0.1-1.2)   06/14/21  05:26    


 


AST  13 units/L (5-40)   06/14/21  05:26    


 


ALT  8 units/L (7-56)   06/14/21  05:26    


 


Alkaline Phosphatase  46 units/L ()   06/14/21  05:26    


 


Lactate Dehydrogenase  150 units/L ()   06/13/21  01:55    


 


Troponin T  < 0.010 ng/mL (0.00-0.029)   06/12/21  22:53    


 


C-Reactive Protein  2.40 mg/dL (0.00-1.30)  H  06/13/21  01:55    


 


Total Protein  6.4 g/dL (6.3-8.2)   06/14/21  05:26    


 


Albumin  3.8 g/dL (3.9-5)  L  06/14/21  05:26    


 


Albumin/Globulin Ratio  1.5 %  06/14/21  05:26    


 


Procalcitonin  0.08 ng/mL (<0.15)   06/13/21  01:55    


 


Coronavirus (PCR)  Negative  (Negative)   06/13/21  09:35    











Uribe/IV: 


                                        





Voiding Method                   Toilet











Active Medications





- Current Medications


Current Medications: 














Generic Name Dose Route Start Last Admin





  Trade Name Freq  PRN Reason Stop Dose Admin


 


Acetaminophen  650 mg  06/13/21 06:34 





  Acetaminophen 325 Mg Tab  PO  





  Q4H PRN  





  Pain MILD(1-3)/Fever >100.5/HA  


 


Al Hydrox/Mg Hydrox/Simethicone  30 ml  06/13/21 06:34 





  Alum-Mag Hydroxide-Simethicone [phone]/5ml Oral Liqd 30 Ml  PO  





  Q4H PRN  





  Indigestion  


 


Albuterol/Ipratropium  1 ampul  06/13/21 20:00  06/14/21 20:46





  Ipratropium/Albuterol Sulfate 3 Ml Ampul.Neb  IH   1 ampul





  QIDRT CARL   Administration


 


Budesonide  0.5 mg  06/15/21 08:00 





  Budesonide 0.5 Mg/2 Ml Nebu  IH  





  Q12HRT CARL  


 


Enoxaparin Sodium  40 mg  06/13/21 10:00  06/14/21 09:40





  Enoxaparin 40 Mg/0.4 Ml Inj  SUB-Q   40 mg





  DAILY CARL   Administration





  Protocol  


 


Famotidine  20 mg  06/13/21 10:00  06/14/21 22:00





  Famotidine 20 Mg/2 Ml Inj  IV   20 mg





  BID CARL   Administration


 


Magnesium Hydroxide  30 ml  06/13/21 06:34 





  Magnesium Hydroxide (Mom) Oral Liqd Udc  PO  





  Q4H PRN  





  Constipation  


 


Methylprednisolone Sodium Succinate  60 mg  06/13/21 20:00  06/15/21 06:11





  Methylprednisolone Sod Succinate 125 Mg/2 Ml Inj  IV   60 mg





  Q8H CARL   Administration


 


Ondansetron HCl  4 mg  06/13/21 06:34 





  Ondansetron 4 Mg/2 Ml Inj  IV  





  Q8H PRN  





  Nausea And Vomiting  


 


Senna  8.6 mg  06/13/21 06:34 





  Sennosides 8.6 Mg Tab  PO  





  Q12HR PRN  





  Constipation  


 


Sodium Chloride  10 ml  06/13/21 10:00  06/14/21 22:00





  Sodium Chloride 0.9% 10 Ml Flush Syringe  IV   10 ml





  BID CARL   Administration


 


Sodium Chloride  10 ml  06/13/21 06:34 





  Sodium Chloride 0.9% 10 Ml Flush Syringe  IV  





  PRN PRN  





  LINE FLUSH

## 2021-06-16 VITALS — SYSTOLIC BLOOD PRESSURE: 125 MMHG | DIASTOLIC BLOOD PRESSURE: 76 MMHG

## 2021-06-16 RX ADMIN — METHYLPREDNISOLONE SODIUM SUCCINATE SCH MG: 125 INJECTION, POWDER, FOR SOLUTION INTRAMUSCULAR; INTRAVENOUS at 05:33

## 2021-06-16 RX ADMIN — BUDESONIDE SCH MG: 0.5 INHALANT RESPIRATORY (INHALATION) at 08:30

## 2021-06-16 RX ADMIN — INSULIN LISPRO SCH: 100 INJECTION, SOLUTION INTRAVENOUS; SUBCUTANEOUS at 11:50

## 2021-06-16 RX ADMIN — METHYLPREDNISOLONE SODIUM SUCCINATE SCH MG: 125 INJECTION, POWDER, FOR SOLUTION INTRAMUSCULAR; INTRAVENOUS at 11:49

## 2021-06-16 RX ADMIN — IPRATROPIUM BROMIDE AND ALBUTEROL SULFATE SCH AMPUL: .5; 3 SOLUTION RESPIRATORY (INHALATION) at 08:01

## 2021-06-16 RX ADMIN — INSULIN LISPRO SCH: 100 INJECTION, SOLUTION INTRAVENOUS; SUBCUTANEOUS at 12:23

## 2021-06-16 RX ADMIN — Medication SCH ML: at 11:53

## 2021-06-16 RX ADMIN — ENOXAPARIN SODIUM SCH MG: 100 INJECTION SUBCUTANEOUS at 11:50

## 2021-06-16 RX ADMIN — FAMOTIDINE SCH: 10 INJECTION, SOLUTION INTRAVENOUS at 11:53

## 2021-06-16 RX ADMIN — GUAIFENESIN SYRUP AND DEXTROMETHORPHAN PRN ML: 100; 10 SYRUP ORAL at 07:18

## 2021-06-16 RX ADMIN — INSULIN LISPRO SCH: 100 INJECTION, SOLUTION INTRAVENOUS; SUBCUTANEOUS at 15:29

## 2021-06-16 RX ADMIN — IPRATROPIUM BROMIDE AND ALBUTEROL SULFATE SCH AMPUL: .5; 3 SOLUTION RESPIRATORY (INHALATION) at 14:30

## 2021-06-16 NOTE — DISCHARGE SUMMARY
Providers





- Providers


Date of Admission: 


06/14/21 13:28





Date of discharge: 06/16/21


Attending physician: 


TOMMIE GUSTAFSON MD





                                        





06/13/21 00:11


Consult to Physician [CONS] Routine 


   Comment: 


   Consulting Provider: ELIAS TONEY


   Physician Instructions: 


   Reason For Exam: pui





06/13/21 06:27


Consult to Physician [CONS] Stat 


   Comment: 


   Consulting Provider: CATHY GANDHI


   Physician Instructions: 


   Reason For Exam: hypoxia





06/16/21 07:32


Consult to Case Management [CONS] Routine 


   Services Needed at Discharge: Home O2


   Notified:: cm





06/16/21 07:33


Physical Therapy Evaluation and Treat [CONS] Routine 


   Comment: 


   Reason For Exam: Evaluate and treat











Primary care physician: 


PRIMARY CARE MD








Hospitalization


Reason for admission: COPD exacerbation, acute hypoxic respiratory failure, lung

 mass


Condition: Stable


Pertinent studies: 





CTA chest; suspicious for bronchogenic cancer


Hospital course: 





History of present illness: 





Patient is a 56-year-old female that presents emergency room with complaints of 

difficulty breathing, shortness of breath, cough, fever.  Patient states that 

she has been going on for 1 week.  Patient states her symptoms are worsening.  

Patient states she has history of asthma and COPD and she not take anything for 

it.  Patient states that she also has a rash on her abdomen has been going on 

for 1 week.  Patient states the rash itches.  Patient denies pain in her 

abdomen.  Patient denies chest pain. ED work-up shows WBC 9.5 hemoglobin 16.0 

platelets is 175, sodium is 138, potassium is 5.5, D-dimer is 45.04, creatinine 

is 0.9.  Chest x-ray done no acute finding.  Due to elevated D-dimer CT of the 

chest donenegative for pulmonary embolism per report showed emphysema and lung 

mass within the right upper lobe and small spiculated density within the left 

upper lobe finding concerning for bronchogenic neoplasm.  Patient seen at 

bedside in the ED.  She is alert oriented x3.  She is on oxygen per nasal 

cannula.  She reported mild shortness of breath.  She admits tobacco use 1 pack 

daily.  Discussed tobacco use cessation.  Patient voiced understanding.  

Reviewed lab medication record and vital signs.  Will consult pulmonologist.





Hospital course


=============


56-year-old female admitted to the hospital for shortness of breath due to COPD 

exacerbation and possible right lung carcinoma.





 A/P


-- COPD exacerbation


Continue respiratory care


Bronchodilator and oxygen supplement


Pulmonologist consulted








-- Lung mass


Lung mass appreciated on CT of the chest


Pulmonologist consulted and recommended CT-guided biopsy when clinically much 

stable


Monitor ABG and oxygen supplement








--Acute respiratory failure with hypoxia


Hypoxia likely secondary to emphysema and lung mass


Continue oxygen supplements





--Covid PUI, test negative





--Leukocytosis, likely due to steroid-induced








-- Tobacco abuse


Patient admits tobacco use 1 pack daily 


discussed tobacco use cessation





--History of cocaine abuse, counseled for cessation





-- Elevated d-dimer


CT of the chest is negative for PE


Bilateral leg ultrasound ordered and negative for DVT








-- DVT prophylaxis


Subcutaneous Lovenox





Daily clinical course:


6/14/21: Continue empiric steroid, antibiotic, nebulizer breathing treatment for

 COPD exacerbation, continue supplemental O2 and wean off as tolerated.  Will 

consult IR for possible CT-guided biopsy of the right lung mass. If they are not

 willing the best option for tissue diagnosis would be outpatient 

electromagnetic navigational bronchoscopy or radial EBUS (regular bronch will be

 low-yield); the latter [facility] given 

her lack of funding.





6/15/2021; continue current treatment for COPD exacerbation.  Patient is on 2 L 

of oxygen.  We will do 6-minute walk.  Patient was evaluated by pulmonary.  

Recommended to have work[facility]up for malignancy [facility].  Patient is not intubated. 

 Will discharge once stable with tappering dose of steroids.





6/16/2021; continue current treatment for COPD exacerbation.  Patient was on 2 L

 of oxygen.  6-minute walk was done and her oxygen saturation dropped to 88%.  

Discussed with case management to arrange home oxygen.  Patient can be 

discharged once home oxygen is arranged.  I have extensive discussion with the 

patient that the patient need to have outpatient electromagnetic navigational 

bronchoscopy or radial EBUS (regular bronch will be low-yield); the latter 

procedure would likely need to be done [facility] given her lack of funding.  I 

have discussed with the patient in detail and she states [facility] to [facility].  

Home oxygen arranged.  CT scan is suggestive of bronchogenic pneumonia and 

patient understood.  Pulmonary consult appreciated.


Disposition: [id number] HOME UNDER HOME [facility]


Final Discharge Diagnosis (Prints w/discharge instructions): COPD exacerbation. 

 Acute hypoxic respiratory failure.  Lung mass suspected for malignancy


Time spent for discharge: 35 minutes





- Discharge Diagnoses


(1) COPD exacerbation


Status: Acute   





(2) Elevated d-dimer


Status: Acute   





(3) Lung mass


Status: Acute   





(4) Respiratory failure with hypoxia


Status: Acute   





Core Measure Documentation





- Palliative Care


Palliative Care/ Comfort Measures: Not Applicable





- Core Measures


Any of the following diagnoses?: none





Exam





- Physical Exam


Narrative exam: 





 Not in cardiopulmonary distress. 


 The patient appeared well nourished and normally developed.


 Vital signs as documented.


 Head exam is unremarkable.


 No scleral icterus .


 Neck is without jugular venous distension, thyromegaly, or carotid bruits. 


 Lungs are clear to auscultation.


Cardiac exam reveals regular rate and  Rhythm. 


Abdominal exam reveals normal bowel sounds, nontender, no organomegaly.


Extremities are nonedematous and both femoral and pedal pulses are normal.


CNS: Alert and oriented 3.  No focal weakness.





- Constitutional


Vitals: 


                                        











Temp Pulse Resp BP Pulse Ox


 


 97.9 F   77   18   140/86   94 


 


 06/16/21 05:24  06/16/21 08:05  06/16/21 08:05  06/16/21 05:24  06/16/21 09:49














Plan


Activity: no restrictions


Weight Bearing Status: Full Weight Bearing


Diet: regular


Additional Instructions: Patient advised to follow-up at [facility]

 for work-up of her lung mass which is suspected for malignancy.


Follow up with: 


PRIMARY CARE,MD [Primary Care Provider] - 3-5 Days


Prescriptions: 


Nebulizer and Compressor [[place] Choice Nebulizer] 1 each MC BID #1 each


guaiFENesin DM [Guaifenesin Dm Syrup] 10 ml PO Q8H PRN #1 oral.liqd


 PRN Reason: Cough


predniSONE 10 mg PO QDAY #39 tab


Budesonide [Pulmicort Respules] 0.5 mg IH Q12HRT #60 nebu


Ipratropium/Albuterol Sulfate [DUONEB *Not for PRN Use*] 1 ampul IH TIDRT #60 

ampul.neb

## 2021-06-16 NOTE — PROGRESS NOTE
Assessment and Plan


Assessment and plan: 





56-year-old female admitted to the hospital for shortness of breath due to COPD 

exacerbation and possible right lung carcinoma.





 A/P


-- COPD exacerbation


Continue respiratory care


Bronchodilator and oxygen supplement


Pulmonologist consulted








-- Lung mass


Lung mass appreciated on CT of the chest


Pulmonologist consulted and recommended CT-guided biopsy when clinically much 

stable


Monitor ABG and oxygen supplement








--Acute respiratory failure with hypoxia


Hypoxia likely secondary to emphysema and lung mass


Continue oxygen supplements





--Covid PUI, test negative





--Leukocytosis, likely due to steroid-induced








-- Tobacco abuse


Patient admits tobacco use 1 pack daily 


discussed tobacco use cessation





--History of cocaine abuse, counseled for cessation





-- Elevated d-dimer


CT of the chest is negative for PE


Bilateral leg ultrasound ordered and negative for DVT








-- DVT prophylaxis


Subcutaneous Lovenox





Daily clinical course:


6/14/21: Continue empiric steroid, antibiotic, nebulizer breathing treatment for

COPD exacerbation, continue supplemental O2 and wean off as tolerated.  Will 

consult IR for possible CT-guided biopsy of the right lung mass. If they are not

willing the best option for tissue diagnosis would be outpatient electromagnetic

navigational bronchoscopy or radial EBUS (regular bronch will be low-yield); the

latter procedure would likely need to be done at [place] given her lack of 

funding.





6/15/2021; continue current treatment for COPD exacerbation.  Patient is on 2 L 

of oxygen.  We will do 6-minute walk.  Patient was evaluated by pulmonary.  

Recommended to have work-up for malignancy at [place].  Patient is not intubated. 

Will discharge once stable with tappering dose of steroids.





6/16/2021; continue current treatment for COPD exacerbation.  Patient was on 2 L

of oxygen.  6-minute walk was done and her oxygen saturation dropped to 88%.  

Discussed with case management to arrange home oxygen.  Patient can be discha

rged once home oxygen is arranged.





History


Interval history: 





Patient was seen and evaluated this morning


Patient was complaining left-sided chest pain that started yesterday afternoon








Hospitalist Physical





- Physical exam


Narrative exam: 





 Not in cardiopulmonary distress. 


 The patient appeared well nourished and normally developed.


 Vital signs as documented.


 Head exam is unremarkable.


 No scleral icterus .


 Neck is without jugular venous distension, thyromegaly, or carotid bruits. 


 Lungs are clear to auscultation.


Cardiac exam reveals regular rate and  Rhythm. 


Abdominal exam reveals normal bowel sounds, nontender, no organomegaly.


Extremities are nonedematous and both femoral and pedal pulses are normal.


CNS: Alert and oriented 3.  No focal weakness.





- Constitutional


Vitals: 


                                        











Temp Pulse Resp BP Pulse Ox


 


 97.9 F   77   18   140/86   94 


 


 06/16/21 05:24  06/16/21 08:05  06/16/21 08:05  06/16/21 05:24  06/16/21 09:49











General appearance: Present: mild distress, well-nourished





HEART Score





- HEART Score


Troponin: 


                                        











Troponin T  < 0.010 ng/mL (0.00-0.029)   06/12/21  22:53    














Results





- Labs


CBC & Chem 7: 


                                 06/14/21 05:26





                                 06/14/21 05:26


Labs: 


                             Laboratory Last Values











WBC  11.8 K/mm3 (4.5-11.0)  H  06/14/21  05:26    


 


RBC  4.33 M/mm3 (3.65-5.03)   06/14/21  05:26    


 


Hgb  13.5 gm/dl (10.1-14.3)   06/14/21  05:26    


 


Hct  40.2 % (30.3-42.9)  D 06/14/21  05:26    


 


MCV  93 fl (79-97)   06/14/21  05:26    


 


MCH  31 pg (28-32)   06/14/21  05:26    


 


MCHC  34 % (30-34)   06/14/21  05:26    


 


RDW  13.3 % (13.2-15.2)   06/14/21  05:26    


 


Plt Count  165 K/mm3 (140-440)   06/14/21  05:26    


 


Lymph % (Auto)  18.6 % (13.4-35.0)   06/12/21  17:04    


 


Mono % (Auto)  10.8 % (0.0-7.3)  H  06/12/21  17:04    


 


Eos % (Auto)  1.8 % (0.0-4.3)   06/12/21  17:04    


 


Baso % (Auto)  0.8 % (0.0-1.8)   06/12/21  17:04    


 


Lymph # (Auto)  1.8 K/mm3 (1.2-5.4)   06/12/21  17:04    


 


Mono # (Auto)  1.0 K/mm3 (0.0-0.8)  H  06/12/21  17:04    


 


Eos # (Auto)  0.2 K/mm3 (0.0-0.4)   06/12/21  17:04    


 


Baso # (Auto)  0.1 K/mm3 (0.0-0.1)   06/12/21  17:04    


 


Add Manual Diff  Complete   06/14/21  05:26    


 


Total Counted  100   06/14/21  05:26    


 


Seg Neutrophils %  Np   06/14/21  05:26    


 


Seg Neuts % (Manual)  97.0 % (40.0-70.0)  H  06/14/21  05:26    


 


Lymphocytes % (Manual)  3.0 % (13.4-35.0)  L  06/14/21  05:26    


 


Nucleated RBC %  Not Reportable   06/14/21  05:26    


 


Seg Neutrophils #  6.4 K/mm3 (1.8-7.7)   06/12/21  17:04    


 


Seg Neutrophils # Man  11.4 K/mm3 (1.8-7.7)  H  06/14/21  05:26    


 


Band Neutrophils #  0.0 K/mm3  06/14/21  05:26    


 


Lymphocytes # (Manual)  0.4 K/mm3 (1.2-5.4)  L  06/14/21  05:26    


 


Abs React Lymphs (Man)  0.0 K/mm3  06/14/21  05:26    


 


Monocytes # (Manual)  0.0 K/mm3 (0.0-0.8)   06/14/21  05:26    


 


Eosinophils # (Manual)  0.0 K/mm3 (0.0-0.4)   06/14/21  05:26    


 


Basophils # (Manual)  0.0 K/mm3 (0.0-0.1)   06/14/21  05:26    


 


Metamyelocytes #  0.0 K/mm3  06/14/21  05:26    


 


Myelocytes #  0.0 K/mm3  06/14/21  05:26    


 


Promyelocytes #  0.0 K/mm3  06/14/21  05:26    


 


Blast Cells #  0.0 K/mm3  06/14/21  05:26    


 


WBC Morphology  Not Reportable   06/14/21  05:26    


 


Hypersegmented Neuts  Not Reportable   06/14/21  05:26    


 


Hyposegmented Neuts  Not Reportable   06/14/21  05:26    


 


Hypogranular Neuts  Not Reportable   06/14/21  05:26    


 


Smudge Cells  Not Reportable   06/14/21  05:26    


 


Toxic Granulation  Not Reportable   06/14/21  05:26    


 


Toxic Vacuolation  Not Reportable   06/14/21  05:26    


 


Dohle Bodies  Not Reportable   06/14/21  05:26    


 


Pelger-Huet Anomaly  Not Reportable   06/14/21  05:26    


 


Hector Rods  Not Reportable   06/14/21  05:26    


 


Platelet Estimate  Consistent w auto   06/14/21  05:26    


 


Clumped Platelets  Not Reportable   06/14/21  05:26    


 


Plt Clumps, EDTA  Not Reportable   06/14/21  05:26    


 


Large Platelets  Not Reportable   06/14/21  05:26    


 


Giant Platelets  Not Reportable   06/14/21  05:26    


 


Platelet Satelliting  Not Reportable   06/14/21  05:26    


 


Plt Morphology Comment  Not Reportable   06/14/21  05:26    


 


RBC Morphology  Not Reportable   06/14/21  05:26    


 


Dimorphic RBCs  Not Reportable   06/14/21  05:26    


 


Polychromasia  Not Reportable   06/14/21  05:26    


 


Hypochromasia  Not Reportable   06/14/21  05:26    


 


Poikilocytosis  Not Reportable   06/14/21  05:26    


 


Anisocytosis  1+   06/14/21  05:26    


 


Microcytosis  Not Reportable   06/14/21  05:26    


 


Macrocytosis  Not Reportable   06/14/21  05:26    


 


Spherocytes  Not Reportable   06/14/21  05:26    


 


Pappenheimer Bodies  Not Reportable   06/14/21  05:26    


 


Sickle Cells  Not Reportable   06/14/21  05:26    


 


Target Cells  Not Reportable   06/14/21  05:26    


 


Tear Drop Cells  Not Reportable   06/14/21  05:26    


 


Ovalocytes  Not Reportable   06/14/21  05:26    


 


Helmet Cells  Not Reportable   06/14/21  05:26    


 


[place] Bodies  Not Reportable   06/14/21  05:26    


 


Cabot Rings  Not Reportable   06/14/21  05:26    


 


Lucas Cells  Not Reportable   06/14/21  05:26    


 


Bite Cells  Not Reportable   06/14/21  05:26    


 


Crenated Cell  Not Reportable   06/14/21  05:26    


 


Elliptocytes  Not Reportable   06/14/21  05:26    


 


Acanthocytes (Spur)  Not Reportable   06/14/21  05:26    


 


Rouleaux  Not Reportable   06/14/21  05:26    


 


Hemoglobin C Crystals  Not Reportable   06/14/21  05:26    


 


Schistocytes  Not Reportable   06/14/21  05:26    


 


Malaria parasites  Not Reportable   06/14/21  05:26    


 


Anjel Bodies  Not Reportable   06/14/21  05:26    


 


Hem Pathologist Commnt  No   06/14/21  05:26    


 


PT  13.1 Sec. (12.2-14.9)   06/12/21  17:04    


 


INR  0.93  (0.87-1.13)   06/12/21  17:04    


 


APTT  28.1 Sec. (24.2-36.6)   06/12/21  17:04    


 


D-Dimer  1245.04 ng/mlDDU (0-234)  H  06/13/21  01:55    


 


Sodium  139 mmol/L (137-145)   06/14/21  05:26    


 


Sodium  140 mmol/L (137-145)   06/14/21  05:26    


 


Potassium  4.0 mmol/L (3.6-5.0)  D 06/14/21  05:26    


 


Potassium  4.1 mmol/L (3.6-5.0)   06/14/21  05:26    


 


Chloride  101.2 mmol/L ()   06/14/21  05:26    


 


Chloride  102.0 mmol/L ()   06/14/21  05:26    


 


Carbon Dioxide  28 mmol/L (22-30)   06/14/21  05:26    


 


Carbon Dioxide  29 mmol/L (22-30)   06/14/21  05:26    


 


Anion Gap  13 mmol/L  06/14/21  05:26    


 


Anion Gap  14 mmol/L  06/14/21  05:26    


 


BUN  14 mg/dL (7-17)   06/14/21  05:26    


 


BUN  14 mg/dL (7-17)   06/14/21  05:26    


 


Creatinine  0.7 mg/dL (0.6-1.2)   06/14/21  05:26    


 


Creatinine  0.7 mg/dL (0.6-1.2)   06/14/21  05:26    


 


Estimated GFR  > 60 ml/min  06/14/21  05:26    


 


Estimated GFR  > 60 ml/min  06/14/21  05:26    


 


BUN/Creatinine Ratio  20 %  06/14/21  05:26    


 


BUN/Creatinine Ratio  20 %  06/14/21  05:26    


 


Glucose  137 mg/dL ()  H  06/14/21  05:26    


 


Glucose  138 mg/dL ()  H  06/14/21  05:26    


 


POC Glucose  118 mg/dL ()  H  06/16/21  08:16    


 


Hemoglobin A1c  5.7 % (4-6)   06/13/21  07:00    


 


Calcium  8.6 mg/dL (8.4-10.2)   06/14/21  05:26    


 


Calcium  8.6 mg/dL (8.4-10.2)   06/14/21  05:26    


 


Magnesium  2.30 mg/dL (1.7-2.3)   06/13/21  07:00    


 


Ferritin  50.4 ng/mL (10.0-200.0)   06/13/21  01:55    


 


Total Bilirubin  < 0.20 mg/dL (0.1-1.2)   06/14/21  05:26    


 


AST  13 units/L (5-40)   06/14/21  05:26    


 


ALT  8 units/L (7-56)   06/14/21  05:26    


 


Alkaline Phosphatase  46 units/L ()   06/14/21  05:26    


 


Lactate Dehydrogenase  150 units/L ()   06/13/21  01:55    


 


Troponin T  < 0.010 ng/mL (0.00-0.029)   06/12/21  22:53    


 


C-Reactive Protein  2.40 mg/dL (0.00-1.30)  H  06/13/21  01:55    


 


Total Protein  6.4 g/dL (6.3-8.2)   06/14/21  05:26    


 


Albumin  3.8 g/dL (3.9-5)  L  06/14/21  05:26    


 


Albumin/Globulin Ratio  1.5 %  06/14/21  05:26    


 


Procalcitonin  0.08 ng/mL (<0.15)   06/13/21  01:55    


 


Coronavirus (PCR)  Negative  (Negative)   06/13/21  09:35    











Uribe/IV: 


                                        





Voiding Method                   Toilet











Active Medications





- Current Medications


Current Medications: 














Generic Name Dose Route Start Last Admin





  Trade Name Freq  PRN Reason Stop Dose Admin


 


Acetaminophen  650 mg  06/13/21 06:34 





  Acetaminophen 325 Mg Tab  PO  





  Q4H PRN  





  Pain MILD(1-3)/Fever >100.5/HA  


 


Al Hydrox/Mg Hydrox/Simethicone  30 ml  06/13/21 06:34 





  Alum-Mag Hydroxide-Simethicone [phone]/5ml Oral Liqd 30 Ml  PO  





  Q4H PRN  





  Indigestion  


 


Albuterol/Ipratropium  1 ampul  06/15/21 14:00  06/16/21 08:01





  Ipratropium/Albuterol Sulfate 3 Ml Ampul.Neb  IH   1 ampul





  TIDRT CARL   Administration


 


Budesonide  0.5 mg  06/15/21 08:00  06/15/21 20:36





  Budesonide 0.5 Mg/2 Ml Nebu  IH   0.5 mg





  Q12HRT CARL   Administration


 


Enoxaparin Sodium  40 mg  06/13/21 10:00  06/16/21 11:50





  Enoxaparin 40 Mg/0.4 Ml Inj  SUB-Q   40 mg





  DAILY CARL   Administration





  Protocol  


 


Famotidine  20 mg  06/16/21 10:00  06/16/21 11:50





  Famotidine 20 Mg Tab  PO   20 mg





  BID CARL   Administration


 


Guaifenesin  10 ml  06/15/21 22:46  06/16/21 07:18





  Guaifenesin Dm 200/20 Mg Oral Liqd 10 Ml  PO   10 ml





  Q8H PRN   Administration





  Cough  


 


Insulin Human Lispro  0 unit  06/15/21 22:00  06/16/21 11:50





  Insulin Lispro 100 Unit/Ml  SUB-Q   Not Given





  [facility]  





  Protocol  


 


Magnesium Hydroxide  30 ml  06/13/21 06:34 





  Magnesium Hydroxide (Mom) Oral Liqd Udc  PO  





  Q4H PRN  





  Constipation  


 


Methylprednisolone Sodium Succinate  60 mg  06/13/21 20:00  06/16/21 11:49





  Methylprednisolone Sod Succinate 125 Mg/2 Ml Inj  IV   60 mg





  Q8H CARL   Administration


 


Ondansetron HCl  4 mg  06/13/21 06:34 





  Ondansetron 4 Mg/2 Ml Inj  IV  





  Q8H PRN  





  Nausea And Vomiting  


 


Senna  8.6 mg  06/13/21 06:34 





  Sennosides 8.6 Mg Tab  PO  





  Q12HR PRN  





  Constipation  


 


Sodium Chloride  10 ml  06/13/21 10:00  06/16/21 11:53





  Sodium Chloride 0.9% 10 Ml Flush Syringe  IV   10 ml





  BID CARL   Administration


 


Sodium Chloride  10 ml  06/13/21 06:34 





  Sodium Chloride 0.9% 10 Ml Flush Syringe  IV  





  PRN PRN  





  LINE FLUSH

## 2021-06-17 NOTE — ELECTROCARDIOGRAPH REPORT
[facility]

                                       

Test Date:    2021               Test Time:    17:15:24

Pat Name:     FERNANDO NAYLOR          Department:   

Patient ID:   [id number]          Room:         A3 1

Gender:       F                        Technician:   MICHELLE

:          1965               Requested By: ADRIANA DEVINE

Order Number: [id number]              Reading MD:   Ilana Gibson

                                 Measurements

Intervals                              Axis          

Rate:         73                       P:            71

OH:           146                      QRS:          64

QRSD:         81                       T:            55

QT:           375                                    

QTc:          414                                    

                           Interpretive Statements

Sinus rhythm

Probable left atrial enlargement

No previous ECG available for comparison

Electronically Signed On 2021 12:53:13 EDT by Ilana Gibson

## 2021-09-01 ENCOUNTER — HOSPITAL ENCOUNTER (OUTPATIENT)
Dept: HOSPITAL 5 - XRAY | Age: 56
Discharge: HOME | End: 2021-09-01
Attending: INTERNAL MEDICINE
Payer: MEDICAID

## 2021-09-01 DIAGNOSIS — Z13.220: ICD-10-CM

## 2021-09-01 DIAGNOSIS — J44.9: ICD-10-CM

## 2021-09-01 DIAGNOSIS — M54.12: ICD-10-CM

## 2021-09-01 DIAGNOSIS — E55.9: ICD-10-CM

## 2021-09-01 DIAGNOSIS — Z13.1: ICD-10-CM

## 2021-09-01 DIAGNOSIS — M19.022: Primary | ICD-10-CM

## 2021-09-01 LAB
ALBUMIN SERPL-MCNC: 3.9 G/DL (ref 3.9–5)
ALT SERPL-CCNC: 10 UNITS/L (ref 7–56)
BASOPHILS # (AUTO): 0.1 K/MM3 (ref 0–0.1)
BASOPHILS NFR BLD AUTO: 1.3 % (ref 0–1.8)
BUN SERPL-MCNC: 12 MG/DL (ref 7–17)
BUN/CREAT SERPL: 15 %
CALCIUM SERPL-MCNC: 9.4 MG/DL (ref 8.4–10.2)
EOSINOPHIL # BLD AUTO: 0.2 K/MM3 (ref 0–0.4)
EOSINOPHIL NFR BLD AUTO: 2.6 % (ref 0–4.3)
HCT VFR BLD CALC: 41.9 % (ref 30.3–42.9)
HDLC SERPL-MCNC: 58 MG/DL (ref 40–59)
HEMOLYSIS INDEX: 7
HGB BLD-MCNC: 14.4 GM/DL (ref 10.1–14.3)
LYMPHOCYTES # BLD AUTO: 1.9 K/MM3 (ref 1.2–5.4)
LYMPHOCYTES NFR BLD AUTO: 28.2 % (ref 13.4–35)
MCHC RBC AUTO-ENTMCNC: 34 % (ref 30–34)
MCV RBC AUTO: 93 FL (ref 79–97)
MONOCYTES # (AUTO): 0.3 K/MM3 (ref 0–0.8)
MONOCYTES % (AUTO): 3.9 % (ref 0–7.3)
PLATELET # BLD: 218 K/MM3 (ref 140–440)
RBC # BLD AUTO: 4.51 M/MM3 (ref 3.65–5.03)

## 2021-09-01 PROCEDURE — 80061 LIPID PANEL: CPT

## 2021-09-01 PROCEDURE — 86705 HEP B CORE ANTIBODY IGM: CPT

## 2021-09-01 PROCEDURE — 82306 VITAMIN D 25 HYDROXY: CPT

## 2021-09-01 PROCEDURE — 85025 COMPLETE CBC W/AUTO DIFF WBC: CPT

## 2021-09-01 PROCEDURE — 86709 HEPATITIS A IGM ANTIBODY: CPT

## 2021-09-01 PROCEDURE — 80053 COMPREHEN METABOLIC PANEL: CPT

## 2021-09-01 PROCEDURE — 86803 HEPATITIS C AB TEST: CPT

## 2021-09-01 PROCEDURE — 86592 SYPHILIS TEST NON-TREP QUAL: CPT

## 2021-09-01 PROCEDURE — 86689 HTLV/HIV CONFIRMJ ANTIBODY: CPT

## 2021-09-01 PROCEDURE — 36415 COLL VENOUS BLD VENIPUNCTURE: CPT

## 2021-09-01 PROCEDURE — 84443 ASSAY THYROID STIM HORMONE: CPT

## 2021-09-01 PROCEDURE — 86695 HERPES SIMPLEX TYPE 1 TEST: CPT

## 2021-09-01 PROCEDURE — 86706 HEP B SURFACE ANTIBODY: CPT

## 2021-09-01 PROCEDURE — 83036 HEMOGLOBIN GLYCOSYLATED A1C: CPT

## 2021-09-01 NOTE — XRAY REPORT
XR elbow 3+V LT



INDICATION / CLINICAL INFORMATION:

EFFUSION,LEFT ELBOW.



COMPARISON: 

July 28, 2020



FINDINGS:



No acute fracture.  Mild humeroulnar degenerative changes. Irregularity and enthesopathy of the olecr
anon. Normal alignment.  No destructive osseous lesion or suspicious periosteal reaction.



Impression:

1. No significant interval change. No acute findings.

 



Signer Name: Saravanan Ferrell MD 

Signed: 9/1/2021 1:55 PM

Workstation Name: [id number]

## 2021-09-03 ENCOUNTER — HOSPITAL ENCOUNTER (OUTPATIENT)
Dept: HOSPITAL 5 - MAMMO | Age: 56
Discharge: HOME | End: 2021-09-03
Attending: INTERNAL MEDICINE
Payer: MEDICAID

## 2021-09-03 DIAGNOSIS — Z12.31: Primary | ICD-10-CM

## 2021-09-03 PROCEDURE — 77067 SCR MAMMO BI INCL CAD: CPT

## 2021-09-16 ENCOUNTER — HOSPITAL ENCOUNTER (OUTPATIENT)
Dept: HOSPITAL 5 - MAMMO | Age: 56
Discharge: HOME | End: 2021-09-16
Attending: INTERNAL MEDICINE
Payer: MEDICAID

## 2021-09-16 DIAGNOSIS — R92.8: Primary | ICD-10-CM

## 2021-09-16 NOTE — MAMMOGRAPHY REPORT
DIGITAL DIAGNOSTIC MAMMOGRAM WITH CAD CONVENTIONAL, 9/16/2021



CLINICAL INFORMATION / INDICATION: Patient presents as a callback from screening mammogram for furthe
r evaluation of a focal asymmetric density in the right breast. 



TECHNIQUE:  Digital right mammographic imaging was performed. Spot compression views were obtained.

This examination was interpreted with the benefit of Computer-aided Detection analysis. 



COMPARISON: Prior mammogram 9/3/2021



FINDINGS: 



Breast Density: There are scattered areas of fibroglandular density.



The previously described density in the 8:00 position of the right breast does not persist on additio
nal views and is compatible with overlapping fibroglandular tissue. No suspicious mammographic abnorm
ality identified.



IMPRESSION: 

1. The previously described density is most compatible with overlapping fibroglandular tissue. No shital
picious mammographic abnormality identified.



Follow up recommendation: Routine yearly



BI-RADS Category 1:  Negative.



-------------------------------------------------------------------------------------------

A "normal" or negative report should not discourage follow up or biopsy of a clinically significant f
inding.



A written summary of these findings will be mailed to the patient. The patient will be entered into a
 mammography reporting system which will generate a reminder letter for the patient's next appointmen
t at the appropriate interval.



According to the [employer], yearly mammograms are recommended starting at age 40 
and continuing as long as a woman is in good health.  Breast MRI is recommended for women with an mary
roximately 20-25% or greater lifetime risk of breast cancer, including women with a strong family his
tory of breast or ovarian cancer and women who have been treated for Hodgkin's disease.



Signer Name: Renetta Gage MD 

Signed: 9/16/2021 10:55 AM

Workstation Name: [id number]

## 2021-10-26 ENCOUNTER — HOSPITAL ENCOUNTER (OUTPATIENT)
Dept: HOSPITAL 5 - CT | Age: 56
Discharge: HOME | End: 2021-10-26
Attending: SPECIALIST
Payer: MEDICAID

## 2021-10-26 DIAGNOSIS — R91.1: Primary | ICD-10-CM

## 2021-10-26 PROCEDURE — 71250 CT THORAX DX C-: CPT

## 2021-10-26 NOTE — CAT SCAN REPORT
CT CHEST WITHOUT CONTRAST



INDICATION / CLINICAL INFORMATION:

R91.1 PULMONARY NODULE. Previous abnormal CTA chest with bilateral spiculated pulmonary nodules.



TECHNIQUE:

Axial CT images were obtained through the chest without contrast. All CT scans at this location are p
erformed using CT dose reduction for ALARA by means of automated exposure control. 



COMPARISON:

CTA chest dated 6/12/2021



FINDINGS:

HEART: No significant abnormality. 

VASCULATURE: Normal caliber of the aorta with mild aortic and coronary atherosclerosis. 

LYMPH NODES: No significant adenopathy.

TRACHEA AND BRONCHI:No significant abnormality. 



LUNGS: A spiculated solid right upper lobe noncalcified nodule is again seen on image 47 of series 2 
located just above the right hilum measuring 1.8 x 1.6 cm, previously 1.6 x 1.4 cm. A spiculated aren
d noncalcified left upper lobe nodule measures 0.7 x 0.6 cm on image 28 of series 2, previously 0.9 x
 0.7 cm. No other suspicious nodule or mass. Mild emphysema is unchanged.  No suspicious consolidatio
n. No significant pleural effusion. No pneumothorax.



UPPER ABDOMEN: No significant abnormality.



BONES: No acute abnormality or other significant interval changes.



ADDITIONAL FINDINGS: None.



IMPRESSION:

1. Slightly larger right upper lobe spiculated nodule, measuring 1.8 x 1.6 cm, concerning for broncho
genic carcinoma until proven otherwise. Biopsy is recommended for further evaluation. Given the centr
al location and close proximity of bronchial segments, bronchoscopic guided biopsy is suggested over 
percutaneous biopsy.

2. Slightly smaller left upper lobe nodule is favored to be benign.

3. No other significant interval changes.



Signer Name: Yandel Knutson MD 

Signed: 10/26/2021 4:40 PM

Workstation Name: [id number]

## 2021-12-23 ENCOUNTER — HOSPITAL ENCOUNTER (OUTPATIENT)
Dept: HOSPITAL 5 - PET | Age: 56
Discharge: HOME | End: 2021-12-23
Attending: SPECIALIST
Payer: MEDICAID

## 2021-12-23 DIAGNOSIS — R91.1: ICD-10-CM

## 2021-12-23 DIAGNOSIS — M43.22: ICD-10-CM

## 2021-12-23 DIAGNOSIS — N63.10: Primary | ICD-10-CM

## 2021-12-23 PROCEDURE — 78815 PET IMAGE W/CT SKULL-THIGH: CPT

## 2021-12-23 PROCEDURE — 82962 GLUCOSE BLOOD TEST: CPT

## 2021-12-23 PROCEDURE — A9552 F18 FDG: HCPCS

## 2021-12-23 NOTE — PET REPORT
POSITRON EMISSION TOMOGRAPHY WITH CT FOR ATTENUATION CORRECTION AND ANATOMIC CORRELATION ONLY



Indication:  Solitary pulmonary nodule



Comparison: CT chest 10/26/2021



Technique: Study was performed from skull base to mid thigh using 13.85 millicuries of F18-FDG inject
ed into the right wrist at 0931 hours with scan initiation time of 1028 hours. Just prior to injectio
n, serum glucose level was measured at 88 mg/dl. Low-resolution CT without contrast was performed. Al
l CT scans at this location are performed using CT dose reduction for ALARA by means of automated exp
osure control.



CT findings: Multilevel posterior cervical fusion changes are noted. Sclerosis in the lateral aspect 
of the left scapula is unchanged. No other focal bony lesions are seen. Views of the head and neck sh
ow no lesions. No significant axillary or chest wall abnormalities are noted. No pleural effusions ar
e seen. No mediastinal or hilar masses are seen. No obvious endobronchial lesions are noted. Spiculat
ed lesion in the left upper lobe laterally is probably slightly smaller though resolution differences
 make evaluation difficult. Certainly this is not increased in size. No other left-sided lesions are 
seen. The right lung the known spiculated mass is again noted in these central medial aspect of the u
pper lobe adjacent to the hilum. This measures 2 cm in transverse width and 2.1 cm in AP diameter com
pared with 1.8 cm and 1.6 cm respectively, on previous examination. Small pleural parenchymal density
 posteriorly in the right upper lobe is unchanged. Small peripheral nodule posteriorly in the superio
r segment of the right lower lobe is stable. No new nodules or masses are seen.



Views of the abdomen and pelvis show no bowel or urinary obstructive changes. No significant lymphade
nopathy is seen. No free fluid is noted. No inflammatory changes are seen. Upper abdomen shows motion
 artifact but no obvious masses are identified. Adrenals appear within normal limits. Gallbladder and
 bile ducts appear within normal limits. No pelvic masses are seen. Uterus probably has been removed.




PET findings: No hypermetabolic lesions are seen in the visualized head and neck. In the chest the ri
ght upper lobe mass shows prominent increased metabolic activity with maximal SUV of 21.2. The small 
spiculated lesion in the left upper lobe shows only faint activity with maximal SUV of 1.9. No signif
icant increase activity is seen in the martinez or mediastinum. No hypermetabolic lesions are seen below 
the diaphragm.





IMPRESSION: 

1. The enlarging spiculated mass in the right upper lobe shows prominent increased metabolic activity
 consistent with neoplasia.

2. Small spiculated area of the left upper lobe shows only faint activity. Continued CT monitoring is
 suggested.

3. No evidence of metastatic disease.



Signer Name: Raghavendra Maciel MD 

Signed: 12/23/2021 12:32 PM

Workstation Name: [id number]